# Patient Record
Sex: MALE | Race: OTHER | HISPANIC OR LATINO
[De-identification: names, ages, dates, MRNs, and addresses within clinical notes are randomized per-mention and may not be internally consistent; named-entity substitution may affect disease eponyms.]

---

## 2017-02-16 ENCOUNTER — APPOINTMENT (OUTPATIENT)
Dept: HEART AND VASCULAR | Facility: CLINIC | Age: 52
End: 2017-02-16

## 2017-03-07 ENCOUNTER — OUTPATIENT (OUTPATIENT)
Dept: OUTPATIENT SERVICES | Facility: HOSPITAL | Age: 52
LOS: 1 days | End: 2017-03-07

## 2017-03-07 ENCOUNTER — APPOINTMENT (OUTPATIENT)
Dept: RADIOLOGY | Facility: CLINIC | Age: 52
End: 2017-03-07

## 2017-03-07 ENCOUNTER — NON-APPOINTMENT (OUTPATIENT)
Age: 52
End: 2017-03-07

## 2017-03-07 ENCOUNTER — APPOINTMENT (OUTPATIENT)
Dept: INTERNAL MEDICINE | Facility: CLINIC | Age: 52
End: 2017-03-07

## 2017-03-07 VITALS
OXYGEN SATURATION: 98 % | HEIGHT: 68 IN | DIASTOLIC BLOOD PRESSURE: 88 MMHG | TEMPERATURE: 97.6 F | BODY MASS INDEX: 28.64 KG/M2 | WEIGHT: 189 LBS | SYSTOLIC BLOOD PRESSURE: 128 MMHG | HEART RATE: 85 BPM

## 2017-03-07 DIAGNOSIS — Z87.891 PERSONAL HISTORY OF NICOTINE DEPENDENCE: ICD-10-CM

## 2017-03-07 DIAGNOSIS — Z82.0 FAMILY HISTORY OF EPILEPSY AND OTHER DISEASES OF THE NERVOUS SYSTEM: ICD-10-CM

## 2017-03-07 DIAGNOSIS — Z82.49 FAMILY HISTORY OF ISCHEMIC HEART DISEASE AND OTHER DISEASES OF THE CIRCULATORY SYSTEM: ICD-10-CM

## 2017-03-07 DIAGNOSIS — Z82.3 FAMILY HISTORY OF STROKE: ICD-10-CM

## 2017-03-07 DIAGNOSIS — Z86.19 PERSONAL HISTORY OF OTHER INFECTIOUS AND PARASITIC DISEASES: ICD-10-CM

## 2017-03-10 ENCOUNTER — APPOINTMENT (OUTPATIENT)
Dept: HEART AND VASCULAR | Facility: CLINIC | Age: 52
End: 2017-03-10

## 2017-03-10 VITALS
SYSTOLIC BLOOD PRESSURE: 140 MMHG | WEIGHT: 190 LBS | OXYGEN SATURATION: 99 % | HEART RATE: 89 BPM | TEMPERATURE: 97.6 F | HEIGHT: 68 IN | DIASTOLIC BLOOD PRESSURE: 88 MMHG | BODY MASS INDEX: 28.79 KG/M2

## 2017-03-15 LAB
ALBUMIN SERPL ELPH-MCNC: 4.6 G/DL
ALP BLD-CCNC: 84 U/L
ALT SERPL-CCNC: 24 U/L
ANION GAP SERPL CALC-SCNC: 20 MMOL/L
AST SERPL-CCNC: 16 U/L
BASOPHILS # BLD AUTO: 0.03 K/UL
BASOPHILS NFR BLD AUTO: 0.6 %
BILIRUB SERPL-MCNC: 0.5 MG/DL
BUN SERPL-MCNC: 13 MG/DL
CALCIUM SERPL-MCNC: 10 MG/DL
CHLORIDE SERPL-SCNC: 101 MMOL/L
CHOLEST SERPL-MCNC: 247 MG/DL
CHOLEST/HDLC SERPL: 6 RATIO
CO2 SERPL-SCNC: 19 MMOL/L
CREAT SERPL-MCNC: 0.91 MG/DL
EOSINOPHIL # BLD AUTO: 0.34 K/UL
EOSINOPHIL NFR BLD AUTO: 6.5 %
GLUCOSE SERPL-MCNC: 100 MG/DL
HBA1C MFR BLD HPLC: 5.4 %
HCT VFR BLD CALC: 45.6 %
HDLC SERPL-MCNC: 41 MG/DL
HGB BLD-MCNC: 15.2 G/DL
IMM GRANULOCYTES NFR BLD AUTO: 0.2 %
LDLC SERPL CALC-MCNC: NORMAL MG/DL
LYMPHOCYTES # BLD AUTO: 1.83 K/UL
LYMPHOCYTES NFR BLD AUTO: 35.1 %
MAN DIFF?: NORMAL
MCHC RBC-ENTMCNC: 31.3 PG
MCHC RBC-ENTMCNC: 33.3 GM/DL
MCV RBC AUTO: 93.8 FL
MONOCYTES # BLD AUTO: 0.33 K/UL
MONOCYTES NFR BLD AUTO: 6.3 %
NEUTROPHILS # BLD AUTO: 2.67 K/UL
NEUTROPHILS NFR BLD AUTO: 51.3 %
PLATELET # BLD AUTO: 318 K/UL
POTASSIUM SERPL-SCNC: 5 MMOL/L
PROT SERPL-MCNC: 7.4 G/DL
PSA SERPL-MCNC: 1.48 NG/ML
RBC # BLD: 4.86 M/UL
RBC # FLD: 14 %
SODIUM SERPL-SCNC: 140 MMOL/L
TRIGL SERPL-MCNC: 401 MG/DL
TSH SERPL-ACNC: 1.63 UIU/ML
WBC # FLD AUTO: 5.21 K/UL

## 2017-03-16 ENCOUNTER — APPOINTMENT (OUTPATIENT)
Dept: HEART AND VASCULAR | Facility: CLINIC | Age: 52
End: 2017-03-16

## 2017-04-04 ENCOUNTER — APPOINTMENT (OUTPATIENT)
Dept: INTERNAL MEDICINE | Facility: CLINIC | Age: 52
End: 2017-04-04

## 2017-04-04 VITALS
HEART RATE: 83 BPM | HEIGHT: 68 IN | WEIGHT: 190 LBS | SYSTOLIC BLOOD PRESSURE: 110 MMHG | OXYGEN SATURATION: 98 % | DIASTOLIC BLOOD PRESSURE: 72 MMHG | BODY MASS INDEX: 28.79 KG/M2

## 2017-04-10 LAB
ALBUMIN SERPL ELPH-MCNC: 4.6 G/DL
ALP BLD-CCNC: 85 U/L
ALT SERPL-CCNC: 30 U/L
ANION GAP SERPL CALC-SCNC: 16 MMOL/L
AST SERPL-CCNC: 26 U/L
BILIRUB SERPL-MCNC: 0.5 MG/DL
BUN SERPL-MCNC: 13 MG/DL
CALCIUM SERPL-MCNC: 9.6 MG/DL
CHLORIDE SERPL-SCNC: 103 MMOL/L
CO2 SERPL-SCNC: 20 MMOL/L
CREAT SERPL-MCNC: 0.85 MG/DL
GLUCOSE SERPL-MCNC: 103 MG/DL
POTASSIUM SERPL-SCNC: 4.6 MMOL/L
PROT SERPL-MCNC: 7.3 G/DL
SODIUM SERPL-SCNC: 139 MMOL/L

## 2017-06-16 ENCOUNTER — APPOINTMENT (OUTPATIENT)
Dept: HEART AND VASCULAR | Facility: CLINIC | Age: 52
End: 2017-06-16

## 2017-06-16 VITALS
HEIGHT: 68 IN | WEIGHT: 188 LBS | OXYGEN SATURATION: 98 % | BODY MASS INDEX: 28.49 KG/M2 | SYSTOLIC BLOOD PRESSURE: 110 MMHG | HEART RATE: 65 BPM | DIASTOLIC BLOOD PRESSURE: 78 MMHG

## 2017-06-19 LAB
ALBUMIN SERPL ELPH-MCNC: 4.5 G/DL
ALP BLD-CCNC: 82 U/L
ALT SERPL-CCNC: 30 U/L
ANION GAP SERPL CALC-SCNC: 21 MMOL/L
AST SERPL-CCNC: 19 U/L
BILIRUB SERPL-MCNC: 0.5 MG/DL
BUN SERPL-MCNC: 15 MG/DL
CALCIUM SERPL-MCNC: 9.3 MG/DL
CHLORIDE SERPL-SCNC: 104 MMOL/L
CHOLEST SERPL-MCNC: 167 MG/DL
CHOLEST/HDLC SERPL: 3.3 RATIO
CO2 SERPL-SCNC: 21 MMOL/L
CREAT SERPL-MCNC: 0.87 MG/DL
GLUCOSE SERPL-MCNC: 95 MG/DL
HDLC SERPL-MCNC: 50 MG/DL
LDLC SERPL CALC-MCNC: 82 MG/DL
POTASSIUM SERPL-SCNC: 4.9 MMOL/L
PROT SERPL-MCNC: 7 G/DL
SODIUM SERPL-SCNC: 146 MMOL/L
TRIGL SERPL-MCNC: 174 MG/DL

## 2017-07-05 ENCOUNTER — APPOINTMENT (OUTPATIENT)
Dept: INTERNAL MEDICINE | Facility: CLINIC | Age: 52
End: 2017-07-05

## 2017-07-28 ENCOUNTER — APPOINTMENT (OUTPATIENT)
Dept: HEART AND VASCULAR | Facility: CLINIC | Age: 52
End: 2017-07-28
Payer: COMMERCIAL

## 2017-07-28 VITALS
DIASTOLIC BLOOD PRESSURE: 70 MMHG | HEART RATE: 80 BPM | TEMPERATURE: 98.5 F | WEIGHT: 188 LBS | BODY MASS INDEX: 28.49 KG/M2 | OXYGEN SATURATION: 98 % | HEIGHT: 68 IN | SYSTOLIC BLOOD PRESSURE: 110 MMHG | RESPIRATION RATE: 18 BRPM

## 2017-07-28 PROCEDURE — 99214 OFFICE O/P EST MOD 30 MIN: CPT

## 2017-07-28 PROCEDURE — 93880 EXTRACRANIAL BILAT STUDY: CPT

## 2017-08-14 ENCOUNTER — APPOINTMENT (OUTPATIENT)
Dept: GASTROENTEROLOGY | Facility: CLINIC | Age: 52
End: 2017-08-14
Payer: COMMERCIAL

## 2017-08-14 VITALS
SYSTOLIC BLOOD PRESSURE: 118 MMHG | RESPIRATION RATE: 14 BRPM | TEMPERATURE: 97.6 F | OXYGEN SATURATION: 98 % | DIASTOLIC BLOOD PRESSURE: 81 MMHG | BODY MASS INDEX: 28.79 KG/M2 | HEIGHT: 68 IN | HEART RATE: 74 BPM | WEIGHT: 190 LBS

## 2017-08-14 PROCEDURE — 36415 COLL VENOUS BLD VENIPUNCTURE: CPT | Mod: GC

## 2017-08-14 PROCEDURE — 99243 OFF/OP CNSLTJ NEW/EST LOW 30: CPT | Mod: 25

## 2017-09-05 LAB
ANION GAP SERPL CALC-SCNC: 13 MMOL/L
BASOPHILS # BLD AUTO: 0.02 K/UL
BASOPHILS NFR BLD AUTO: 0.4 %
BUN SERPL-MCNC: 17 MG/DL
CALCIUM SERPL-MCNC: 8.9 MG/DL
CHLORIDE SERPL-SCNC: 104 MMOL/L
CO2 SERPL-SCNC: 23 MMOL/L
CREAT SERPL-MCNC: 0.98 MG/DL
EOSINOPHIL # BLD AUTO: 0.45 K/UL
EOSINOPHIL NFR BLD AUTO: 8.6 %
GLUCOSE SERPL-MCNC: 95 MG/DL
HCT VFR BLD CALC: 43.4 %
HGB BLD-MCNC: 13.9 G/DL
IMM GRANULOCYTES NFR BLD AUTO: 0.2 %
LYMPHOCYTES # BLD AUTO: 1.59 K/UL
LYMPHOCYTES NFR BLD AUTO: 30.3 %
MAN DIFF?: NORMAL
MCHC RBC-ENTMCNC: 30 PG
MCHC RBC-ENTMCNC: 32 GM/DL
MCV RBC AUTO: 93.7 FL
MONOCYTES # BLD AUTO: 0.41 K/UL
MONOCYTES NFR BLD AUTO: 7.8 %
NEUTROPHILS # BLD AUTO: 2.76 K/UL
NEUTROPHILS NFR BLD AUTO: 52.7 %
PLATELET # BLD AUTO: 265 K/UL
POTASSIUM SERPL-SCNC: 4.4 MMOL/L
RBC # BLD: 4.63 M/UL
RBC # FLD: 13.7 %
SODIUM SERPL-SCNC: 140 MMOL/L
WBC # FLD AUTO: 5.24 K/UL

## 2017-09-11 ENCOUNTER — OUTPATIENT (OUTPATIENT)
Dept: OUTPATIENT SERVICES | Facility: HOSPITAL | Age: 52
LOS: 1 days | Discharge: HOME | End: 2017-09-11
Payer: COMMERCIAL

## 2017-09-11 ENCOUNTER — RESULT REVIEW (OUTPATIENT)
Age: 52
End: 2017-09-11

## 2017-09-11 ENCOUNTER — APPOINTMENT (OUTPATIENT)
Dept: GASTROENTEROLOGY | Facility: HOSPITAL | Age: 52
End: 2017-09-11

## 2017-09-11 PROCEDURE — 45380 COLONOSCOPY AND BIOPSY: CPT

## 2017-09-11 PROCEDURE — 88305 TISSUE EXAM BY PATHOLOGIST: CPT

## 2017-09-11 PROCEDURE — 45380 COLONOSCOPY AND BIOPSY: CPT | Mod: GC

## 2017-09-12 ENCOUNTER — TRANSCRIPTION ENCOUNTER (OUTPATIENT)
Age: 52
End: 2017-09-12

## 2017-09-12 LAB — SURGICAL PATHOLOGY STUDY: SIGNIFICANT CHANGE UP

## 2017-09-13 DIAGNOSIS — K63.5 POLYP OF COLON: ICD-10-CM

## 2017-09-13 DIAGNOSIS — K64.8 OTHER HEMORRHOIDS: ICD-10-CM

## 2017-09-18 ENCOUNTER — OTHER (OUTPATIENT)
Age: 52
End: 2017-09-18

## 2018-01-02 ENCOUNTER — RX RENEWAL (OUTPATIENT)
Age: 53
End: 2018-01-02

## 2018-01-02 ENCOUNTER — OTHER (OUTPATIENT)
Age: 53
End: 2018-01-02

## 2018-03-01 ENCOUNTER — APPOINTMENT (OUTPATIENT)
Dept: HEART AND VASCULAR | Facility: CLINIC | Age: 53
End: 2018-03-01

## 2018-03-03 ENCOUNTER — RX RENEWAL (OUTPATIENT)
Age: 53
End: 2018-03-03

## 2018-03-08 ENCOUNTER — OTHER (OUTPATIENT)
Age: 53
End: 2018-03-08

## 2018-03-12 ENCOUNTER — MEDICATION RENEWAL (OUTPATIENT)
Age: 53
End: 2018-03-12

## 2018-03-30 ENCOUNTER — APPOINTMENT (OUTPATIENT)
Dept: HEART AND VASCULAR | Facility: CLINIC | Age: 53
End: 2018-03-30
Payer: COMMERCIAL

## 2018-03-30 ENCOUNTER — NON-APPOINTMENT (OUTPATIENT)
Age: 53
End: 2018-03-30

## 2018-03-30 VITALS
DIASTOLIC BLOOD PRESSURE: 78 MMHG | SYSTOLIC BLOOD PRESSURE: 120 MMHG | OXYGEN SATURATION: 98 % | HEART RATE: 67 BPM | WEIGHT: 189 LBS | HEIGHT: 68 IN | BODY MASS INDEX: 28.64 KG/M2

## 2018-03-30 PROCEDURE — 99214 OFFICE O/P EST MOD 30 MIN: CPT

## 2018-05-16 ENCOUNTER — APPOINTMENT (OUTPATIENT)
Dept: INTERNAL MEDICINE | Facility: CLINIC | Age: 53
End: 2018-05-16
Payer: COMMERCIAL

## 2018-05-16 VITALS
TEMPERATURE: 99 F | HEART RATE: 70 BPM | SYSTOLIC BLOOD PRESSURE: 125 MMHG | OXYGEN SATURATION: 98 % | HEIGHT: 67 IN | BODY MASS INDEX: 29.19 KG/M2 | DIASTOLIC BLOOD PRESSURE: 76 MMHG | WEIGHT: 186 LBS

## 2018-05-16 DIAGNOSIS — L30.9 DERMATITIS, UNSPECIFIED: ICD-10-CM

## 2018-05-16 PROCEDURE — 99396 PREV VISIT EST AGE 40-64: CPT

## 2018-05-17 NOTE — HEALTH RISK ASSESSMENT
[Very Good] : ~his/her~  mood as very good [0] : 2) Feeling down, depressed, or hopeless: Not at all (0) [None] : None [Employed] : employed [Fully functional (bathing, dressing, toileting, transferring, walking, feeding)] : Fully functional (bathing, dressing, toileting, transferring, walking, feeding) [Fully functional (using the telephone, shopping, preparing meals, housekeeping, doing laundry, using] : Fully functional and needs no help or supervision to perform IADLs (using the telephone, shopping, preparing meals, housekeeping, doing laundry, using transportation, managing medications and managing finances) [] : No [[id number]] : 0

## 2018-05-17 NOTE — HISTORY OF PRESENT ILLNESS
[FreeTextEntry1] : Annual [de-identified] : No nausea, vomiting, diarrhea, and constipation. No chest pain or shortness of breath.\par Exercising a little more, trying to eat healthily.

## 2018-05-17 NOTE — HISTORY OF PRESENT ILLNESS
[FreeTextEntry1] : Annual [de-identified] : No nausea, vomiting, diarrhea, and constipation. No chest pain or shortness of breath.\par Exercising a little more, trying to eat healthily.

## 2018-05-17 NOTE — HEALTH RISK ASSESSMENT
[Very Good] : ~his/her~  mood as very good [0] : 2) Feeling down, depressed, or hopeless: Not at all (0) [None] : None [Employed] : employed [Fully functional (bathing, dressing, toileting, transferring, walking, feeding)] : Fully functional (bathing, dressing, toileting, transferring, walking, feeding) [Fully functional (using the telephone, shopping, preparing meals, housekeeping, doing laundry, using] : Fully functional and needs no help or supervision to perform IADLs (using the telephone, shopping, preparing meals, housekeeping, doing laundry, using transportation, managing medications and managing finances) [] : No [[id number]] : 0

## 2018-05-30 ENCOUNTER — APPOINTMENT (OUTPATIENT)
Dept: INTERNAL MEDICINE | Facility: CLINIC | Age: 53
End: 2018-05-30
Payer: COMMERCIAL

## 2018-05-30 PROCEDURE — 36415 COLL VENOUS BLD VENIPUNCTURE: CPT

## 2018-05-31 ENCOUNTER — APPOINTMENT (OUTPATIENT)
Dept: DERMATOLOGY | Facility: CLINIC | Age: 53
End: 2018-05-31
Payer: COMMERCIAL

## 2018-05-31 VITALS — DIASTOLIC BLOOD PRESSURE: 70 MMHG | SYSTOLIC BLOOD PRESSURE: 112 MMHG

## 2018-05-31 DIAGNOSIS — L73.9 FOLLICULAR DISORDER, UNSPECIFIED: ICD-10-CM

## 2018-05-31 DIAGNOSIS — L91.8 OTHER HYPERTROPHIC DISORDERS OF THE SKIN: ICD-10-CM

## 2018-05-31 DIAGNOSIS — Q82.5 CONGENITAL NON-NEOPLASTIC NEVUS: ICD-10-CM

## 2018-05-31 PROCEDURE — D0051: CPT

## 2018-05-31 PROCEDURE — 99203 OFFICE O/P NEW LOW 30 MIN: CPT

## 2018-06-06 LAB
ALBUMIN SERPL ELPH-MCNC: 4.4 G/DL
ALP BLD-CCNC: 61 U/L
ALT SERPL-CCNC: 25 U/L
ANION GAP SERPL CALC-SCNC: 14 MMOL/L
AST SERPL-CCNC: 27 U/L
BASOPHILS # BLD AUTO: 0.01 K/UL
BASOPHILS NFR BLD AUTO: 0.2 %
BILIRUB SERPL-MCNC: 0.7 MG/DL
BUN SERPL-MCNC: 15 MG/DL
CALCIUM SERPL-MCNC: 9.1 MG/DL
CHLORIDE SERPL-SCNC: 104 MMOL/L
CHOLEST SERPL-MCNC: 139 MG/DL
CHOLEST/HDLC SERPL: 3.3 RATIO
CK SERPL-CCNC: 158 U/L
CO2 SERPL-SCNC: 23 MMOL/L
CREAT SERPL-MCNC: 0.83 MG/DL
EOSINOPHIL # BLD AUTO: 0.42 K/UL
EOSINOPHIL NFR BLD AUTO: 7.9 %
GLUCOSE SERPL-MCNC: 73 MG/DL
HCT VFR BLD CALC: 43.8 %
HDLC SERPL-MCNC: 42 MG/DL
HGB BLD-MCNC: 14.3 G/DL
IMM GRANULOCYTES NFR BLD AUTO: 0.2 %
LDLC SERPL CALC-MCNC: 65 MG/DL
LYMPHOCYTES # BLD AUTO: 1.72 K/UL
LYMPHOCYTES NFR BLD AUTO: 32.5 %
MAN DIFF?: NORMAL
MCHC RBC-ENTMCNC: 30.9 PG
MCHC RBC-ENTMCNC: 32.6 GM/DL
MCV RBC AUTO: 94.6 FL
MONOCYTES # BLD AUTO: 0.22 K/UL
MONOCYTES NFR BLD AUTO: 4.2 %
NEUTROPHILS # BLD AUTO: 2.91 K/UL
NEUTROPHILS NFR BLD AUTO: 55 %
PLATELET # BLD AUTO: 270 K/UL
POTASSIUM SERPL-SCNC: 4.1 MMOL/L
PROT SERPL-MCNC: 7.1 G/DL
PSA SERPL-MCNC: 1.55 NG/ML
RBC # BLD: 4.63 M/UL
RBC # FLD: 14.6 %
SODIUM SERPL-SCNC: 141 MMOL/L
TESTOST SERPL-MCNC: 366.3 NG/DL
TRIGL SERPL-MCNC: 158 MG/DL
TSH SERPL-ACNC: 2.22 UIU/ML
WBC # FLD AUTO: 5.29 K/UL

## 2018-06-14 ENCOUNTER — APPOINTMENT (OUTPATIENT)
Dept: DERMATOLOGY | Facility: CLINIC | Age: 53
End: 2018-06-14
Payer: COMMERCIAL

## 2018-06-14 DIAGNOSIS — D18.01 HEMANGIOMA OF SKIN AND SUBCUTANEOUS TISSUE: ICD-10-CM

## 2018-06-14 PROCEDURE — D0051: CPT

## 2018-06-28 ENCOUNTER — APPOINTMENT (OUTPATIENT)
Dept: DERMATOLOGY | Facility: CLINIC | Age: 53
End: 2018-06-28

## 2018-08-10 ENCOUNTER — OTHER (OUTPATIENT)
Age: 53
End: 2018-08-10

## 2018-08-24 ENCOUNTER — OTHER (OUTPATIENT)
Age: 53
End: 2018-08-24

## 2018-09-14 ENCOUNTER — RX RENEWAL (OUTPATIENT)
Age: 53
End: 2018-09-14

## 2018-12-04 ENCOUNTER — APPOINTMENT (OUTPATIENT)
Dept: HEART AND VASCULAR | Facility: CLINIC | Age: 53
End: 2018-12-04

## 2018-12-31 ENCOUNTER — MEDICATION RENEWAL (OUTPATIENT)
Age: 53
End: 2018-12-31

## 2019-01-02 ENCOUNTER — APPOINTMENT (OUTPATIENT)
Dept: HEART AND VASCULAR | Facility: CLINIC | Age: 54
End: 2019-01-02
Payer: COMMERCIAL

## 2019-01-02 VITALS
OXYGEN SATURATION: 98 % | WEIGHT: 183 LBS | HEIGHT: 67 IN | BODY MASS INDEX: 28.72 KG/M2 | DIASTOLIC BLOOD PRESSURE: 77 MMHG | HEART RATE: 65 BPM | SYSTOLIC BLOOD PRESSURE: 121 MMHG

## 2019-01-02 PROCEDURE — 99214 OFFICE O/P EST MOD 30 MIN: CPT

## 2019-01-02 RX ORDER — ROSUVASTATIN CALCIUM 10 MG/1
10 TABLET, FILM COATED ORAL
Qty: 90 | Refills: 0 | Status: COMPLETED | COMMUNITY
Start: 2017-03-10 | End: 2019-01-02

## 2019-01-02 NOTE — REASON FOR VISIT
[Follow-Up - Clinic] : a clinic follow-up of [Hyperlipidemia] : hyperlipidemia [Hypertension] : hypertension [FreeTextEntry1] : Patient was lost  to follow up for a bit; he stopped crestor and losartan. No chest discomfort noted. However, he noted a shortness of breath lately. This is often noted with activity. Symptoms always improve at rest.Noted when climbing stairs. No recent labs. \par

## 2019-01-02 NOTE — PHYSICAL EXAM
[General Appearance - Well Developed] : well developed [Normal Appearance] : normal appearance [Well Groomed] : well groomed [General Appearance - Well Nourished] : well nourished [No Deformities] : no deformities [General Appearance - In No Acute Distress] : no acute distress [Normal Conjunctiva] : the conjunctiva exhibited no abnormalities [Eyelids - No Xanthelasma] : the eyelids demonstrated no xanthelasmas [Normal Oral Mucosa] : normal oral mucosa [No Oral Pallor] : no oral pallor [No Oral Cyanosis] : no oral cyanosis [Normal Jugular Venous A Waves Present] : normal jugular venous A waves present [Normal Jugular Venous V Waves Present] : normal jugular venous V waves present [No Jugular Venous Nix A Waves] : no jugular venous nix A waves [Respiration, Rhythm And Depth] : normal respiratory rhythm and effort [Exaggerated Use Of Accessory Muscles For Inspiration] : no accessory muscle use [Auscultation Breath Sounds / Voice Sounds] : lungs were clear to auscultation bilaterally [Heart Rate And Rhythm] : heart rate and rhythm were normal [Heart Sounds] : normal S1 and S2 [Murmurs] : no murmurs present [Abdomen Soft] : soft [Abdomen Tenderness] : non-tender [Abdomen Mass (___ Cm)] : no abdominal mass palpated [Abnormal Walk] : normal gait [Gait - Sufficient For Exercise Testing] : the gait was sufficient for exercise testing [Nail Clubbing] : no clubbing of the fingernails [Cyanosis, Localized] : no localized cyanosis [Petechial Hemorrhages (___cm)] : no petechial hemorrhages [Skin Color & Pigmentation] : normal skin color and pigmentation [] : no rash [No Venous Stasis] : no venous stasis [Skin Lesions] : no skin lesions [No Skin Ulcers] : no skin ulcer [No Xanthoma] : no  xanthoma was observed [Oriented To Time, Place, And Person] : oriented to person, place, and time [Affect] : the affect was normal [Mood] : the mood was normal [No Anxiety] : not feeling anxious

## 2019-01-11 ENCOUNTER — APPOINTMENT (OUTPATIENT)
Dept: HEART AND VASCULAR | Facility: CLINIC | Age: 54
End: 2019-01-11
Payer: COMMERCIAL

## 2019-01-11 VITALS
WEIGHT: 183 LBS | OXYGEN SATURATION: 98 % | SYSTOLIC BLOOD PRESSURE: 136 MMHG | BODY MASS INDEX: 28.72 KG/M2 | TEMPERATURE: 98 F | HEART RATE: 60 BPM | HEIGHT: 67 IN | DIASTOLIC BLOOD PRESSURE: 75 MMHG

## 2019-01-11 VITALS — DIASTOLIC BLOOD PRESSURE: 79 MMHG | SYSTOLIC BLOOD PRESSURE: 131 MMHG

## 2019-01-11 PROCEDURE — 93880 EXTRACRANIAL BILAT STUDY: CPT

## 2019-01-11 PROCEDURE — 36415 COLL VENOUS BLD VENIPUNCTURE: CPT

## 2019-01-11 PROCEDURE — 93306 TTE W/DOPPLER COMPLETE: CPT

## 2019-01-14 LAB
25(OH)D3 SERPL-MCNC: 17.8 NG/ML
ALBUMIN SERPL ELPH-MCNC: 4.6 G/DL
ALP BLD-CCNC: 72 U/L
ALT SERPL-CCNC: 23 U/L
ANION GAP SERPL CALC-SCNC: 10 MMOL/L
AST SERPL-CCNC: 19 U/L
BASOPHILS # BLD AUTO: 0.01 K/UL
BASOPHILS NFR BLD AUTO: 0.2 %
BILIRUB SERPL-MCNC: 0.6 MG/DL
BUN SERPL-MCNC: 15 MG/DL
CALCIUM SERPL-MCNC: 9.7 MG/DL
CHLORIDE SERPL-SCNC: 105 MMOL/L
CHOLEST SERPL-MCNC: 259 MG/DL
CHOLEST/HDLC SERPL: 6.5 RATIO
CO2 SERPL-SCNC: 26 MMOL/L
CREAT SERPL-MCNC: 0.91 MG/DL
EOSINOPHIL # BLD AUTO: 0.35 K/UL
EOSINOPHIL NFR BLD AUTO: 6.2 %
GLUCOSE SERPL-MCNC: 78 MG/DL
HBA1C MFR BLD HPLC: 5.2 %
HCT VFR BLD CALC: 43.3 %
HDLC SERPL-MCNC: 40 MG/DL
HGB BLD-MCNC: 14 G/DL
IMM GRANULOCYTES NFR BLD AUTO: 0.2 %
LDLC SERPL CALC-MCNC: 164 MG/DL
LYMPHOCYTES # BLD AUTO: 1.84 K/UL
LYMPHOCYTES NFR BLD AUTO: 32.6 %
MAGNESIUM SERPL-MCNC: 2.2 MG/DL
MAN DIFF?: NORMAL
MCHC RBC-ENTMCNC: 30.9 PG
MCHC RBC-ENTMCNC: 32.3 GM/DL
MCV RBC AUTO: 95.6 FL
MONOCYTES # BLD AUTO: 0.27 K/UL
MONOCYTES NFR BLD AUTO: 4.8 %
NEUTROPHILS # BLD AUTO: 3.17 K/UL
NEUTROPHILS NFR BLD AUTO: 56 %
PLATELET # BLD AUTO: 332 K/UL
POTASSIUM SERPL-SCNC: 4.5 MMOL/L
PROT SERPL-MCNC: 7.6 G/DL
RBC # BLD: 4.53 M/UL
RBC # FLD: 14 %
SODIUM SERPL-SCNC: 142 MMOL/L
T3FREE SERPL-MCNC: 3.19 PG/ML
T4 FREE SERPL-MCNC: 1.3 NG/DL
TRIGL SERPL-MCNC: 275 MG/DL
TSH SERPL-ACNC: 1.58 UIU/ML
VIT B12 SERPL-MCNC: 472 PG/ML
WBC # FLD AUTO: 5.65 K/UL

## 2019-02-01 ENCOUNTER — APPOINTMENT (OUTPATIENT)
Dept: HEART AND VASCULAR | Facility: CLINIC | Age: 54
End: 2019-02-01
Payer: COMMERCIAL

## 2019-02-01 VITALS
HEART RATE: 74 BPM | OXYGEN SATURATION: 97 % | DIASTOLIC BLOOD PRESSURE: 80 MMHG | SYSTOLIC BLOOD PRESSURE: 126 MMHG | BODY MASS INDEX: 29.51 KG/M2 | WEIGHT: 188 LBS | HEIGHT: 67 IN | TEMPERATURE: 98.1 F

## 2019-02-01 PROCEDURE — 99214 OFFICE O/P EST MOD 30 MIN: CPT

## 2019-03-13 ENCOUNTER — APPOINTMENT (OUTPATIENT)
Dept: PULMONOLOGY | Facility: CLINIC | Age: 54
End: 2019-03-13
Payer: COMMERCIAL

## 2019-03-13 VITALS
BODY MASS INDEX: 29.51 KG/M2 | HEIGHT: 67 IN | DIASTOLIC BLOOD PRESSURE: 71 MMHG | OXYGEN SATURATION: 98 % | TEMPERATURE: 98.5 F | HEART RATE: 65 BPM | WEIGHT: 188 LBS | SYSTOLIC BLOOD PRESSURE: 123 MMHG

## 2019-03-13 DIAGNOSIS — G47.33 OBSTRUCTIVE SLEEP APNEA (ADULT) (PEDIATRIC): ICD-10-CM

## 2019-03-13 PROCEDURE — 99244 OFF/OP CNSLTJ NEW/EST MOD 40: CPT

## 2019-03-13 NOTE — PHYSICAL EXAM
[General Appearance - Well Developed] : well developed [Normal Appearance] : normal appearance [Well Groomed] : well groomed [General Appearance - Well Nourished] : well nourished [No Deformities] : no deformities [General Appearance - In No Acute Distress] : no acute distress [Normal Conjunctiva] : the conjunctiva exhibited no abnormalities [Normal Oropharynx] : normal oropharynx [IV] : IV [Neck Appearance] : the appearance of the neck was normal [Apical Impulse] : the apical impulse was normal [Heart Rate And Rhythm] : heart rate was normal and rhythm regular [] : no respiratory distress [Respiration, Rhythm And Depth] : normal respiratory rhythm and effort [Exaggerated Use Of Accessory Muscles For Inspiration] : no accessory muscle use [Auscultation Breath Sounds / Voice Sounds] : lungs were clear to auscultation bilaterally [Abnormal Walk] : normal gait [Involuntary Movements] : no involuntary movements were seen [Nail Clubbing] : no clubbing of the fingernails [Cyanosis, Localized] : no localized cyanosis [Skin Color & Pigmentation] : normal skin color and pigmentation [No Focal Deficits] : no focal deficits [Oriented To Time, Place, And Person] : oriented to person, place, and time [Impaired Insight] : insight and judgment were intact

## 2019-03-13 NOTE — ASSESSMENT
[FreeTextEntry1] : This is a 53 year old male referred by Dr. Ramirez for evaluation of possible sleep apnea. The patient has multiple signs and symptoms of sleep-disordered breathing including snoring, apneas, unrefreshed sleep, and a grade IV palate. He was referred to the [facility] for a diagnostic PSG. The ramifications of VITALIY and its potential therapeutic modalities were discussed with the patient. The patient was cautioned on the importance of avoiding drowsy driving. He will follow up with us after the PSG.\par \par \par \par

## 2019-03-13 NOTE — CONSULT LETTER
[Dear  ___] : Dear  [unfilled], [Consult Letter:] : I had the pleasure of evaluating your patient, [unfilled]. [Please see my note below.] : Please see my note below. [Consult Closing:] : Thank you very much for allowing me to participate in the care of this patient.  If you have any questions, please do not hesitate to contact me. [Sincerely,] : Sincerely, [FreeTextEntry3] : Anali Glaser MD\par \par [place] [facility] [employer] [facility]\par Pulmonary, Critical Care, and Sleep Medicine\par

## 2019-03-13 NOTE — REVIEW OF SYSTEMS
[EDS: ESS=____] : daytime somnolence: ESS=[unfilled] [Negative] : Psychiatric [Difficulty Initiating Sleep] : no difficulty falling asleep [Lower Extremity Discomfort] : no lower extremity discomfort [Late day/ Evening symptoms] : no late day/evening symptoms [Unusual Sleep Behavior] : no unusual sleep behavior [Hypersomnolence] : not sleeping much more than usual [Cataplexy] :  no cataplexy

## 2019-03-13 NOTE — HISTORY OF PRESENT ILLNESS
[Obstructive Sleep Apnea] : obstructive sleep apnea [Snoring] : snoring [Witnessed Apneas] : witnessed sleep apnea [Daytime Somnolence] : daytime somnolence [ESS: ___] : ESS score [unfilled] [Awakes Unrefreshed] : awakening unrefreshed [Recent  Weight Gain] : recent weight gain [FreeTextEntry1] : 52 yo man with hld presenting with snoring and apneas as reported by his wife. He is endorsing unrefreshed sleep. He is also endorsing excessive daytime somnolence. He is endorsing only light sleep. He has gained weight recently.   [Frequent Nocturnal Awakening] : no nocturnal awakening [Unintentional Sleep while Active] : no unintentional sleep while active [Unintentional Sleep While Inactive] : no unintentional sleep while inactive [Awakes with Headache] : no headache upon awakening [Awakening With Dry Mouth] : no dry mouth upon awakening [DIS] : no DIS [DMS] : no DMS [Unusual Sleep Behavior] : no unusual sleep behavior [Hypersomnolence] : no hypersomnolence [Cataplexy] : no cataplexy [Sleep Paralysis] : no sleep paralysis [Hypnagogic Hallucinations] : no hallucinations when falling asleep [Hypnopompic Hallucinations] : no hallucinations when awakening [Lower Extremity Discomfort] : no lower extremity discomfort in evening or at bedtime

## 2019-03-15 ENCOUNTER — OTHER (OUTPATIENT)
Age: 54
End: 2019-03-15

## 2019-03-22 ENCOUNTER — OUTPATIENT (OUTPATIENT)
Dept: OUTPATIENT SERVICES | Facility: HOSPITAL | Age: 54
LOS: 1 days | End: 2019-03-22
Payer: COMMERCIAL

## 2019-03-22 ENCOUNTER — APPOINTMENT (OUTPATIENT)
Dept: SLEEP CENTER | Facility: HOSPITAL | Age: 54
End: 2019-03-22

## 2019-03-22 DIAGNOSIS — G47.33 OBSTRUCTIVE SLEEP APNEA (ADULT) (PEDIATRIC): ICD-10-CM

## 2019-03-22 PROCEDURE — 95810 POLYSOM 6/> YRS 4/> PARAM: CPT

## 2019-03-22 PROCEDURE — 95810 POLYSOM 6/> YRS 4/> PARAM: CPT | Mod: 26

## 2019-04-24 ENCOUNTER — RX RENEWAL (OUTPATIENT)
Age: 54
End: 2019-04-24

## 2019-05-02 ENCOUNTER — APPOINTMENT (OUTPATIENT)
Dept: HEART AND VASCULAR | Facility: CLINIC | Age: 54
End: 2019-05-02

## 2019-08-21 ENCOUNTER — APPOINTMENT (OUTPATIENT)
Dept: HEART AND VASCULAR | Facility: CLINIC | Age: 54
End: 2019-08-21

## 2019-08-26 ENCOUNTER — APPOINTMENT (OUTPATIENT)
Dept: HEART AND VASCULAR | Facility: CLINIC | Age: 54
End: 2019-08-26
Payer: COMMERCIAL

## 2019-08-26 VITALS
DIASTOLIC BLOOD PRESSURE: 74 MMHG | OXYGEN SATURATION: 98 % | SYSTOLIC BLOOD PRESSURE: 120 MMHG | TEMPERATURE: 98.1 F | HEART RATE: 72 BPM | BODY MASS INDEX: 30.45 KG/M2 | HEIGHT: 67 IN | WEIGHT: 194 LBS

## 2019-08-26 PROCEDURE — 99214 OFFICE O/P EST MOD 30 MIN: CPT

## 2019-08-26 NOTE — REASON FOR VISIT
[Follow-Up - Clinic] : a clinic follow-up of [Dyspnea] : dyspnea [FreeTextEntry1] : Presents for a follow up and evaluation. He admits to increased tinnitus since last we met. 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 He admits to occasional dyspnea. This is often noted with activity. Symptoms always improve at rest. B/p seems to be under good control without medications. Needs to see primary care as he missed his physical this year. \par

## 2019-08-26 NOTE — PHYSICAL EXAM
[General Appearance - Well Developed] : well developed [Normal Appearance] : normal appearance [Well Groomed] : well groomed [General Appearance - Well Nourished] : well nourished [No Deformities] : no deformities [General Appearance - In No Acute Distress] : no acute distress [Normal Conjunctiva] : the conjunctiva exhibited no abnormalities [Eyelids - No Xanthelasma] : the eyelids demonstrated no xanthelasmas [Normal Oral Mucosa] : normal oral mucosa [No Oral Pallor] : no oral pallor [No Oral Cyanosis] : no oral cyanosis [Normal Jugular Venous A Waves Present] : normal jugular venous A waves present [Normal Jugular Venous V Waves Present] : normal jugular venous V waves present [No Jugular Venous Nix A Waves] : no jugular venous nix A waves [Respiration, Rhythm And Depth] : normal respiratory rhythm and effort [Exaggerated Use Of Accessory Muscles For Inspiration] : no accessory muscle use [Auscultation Breath Sounds / Voice Sounds] : lungs were clear to auscultation bilaterally [Heart Rate And Rhythm] : heart rate and rhythm were normal [Heart Sounds] : normal S1 and S2 [Murmurs] : no murmurs present [Abdomen Soft] : soft [Abdomen Tenderness] : non-tender [Abdomen Mass (___ Cm)] : no abdominal mass palpated [Gait - Sufficient For Exercise Testing] : the gait was sufficient for exercise testing [Abnormal Walk] : normal gait [Nail Clubbing] : no clubbing of the fingernails [Cyanosis, Localized] : no localized cyanosis [Petechial Hemorrhages (___cm)] : no petechial hemorrhages [Skin Color & Pigmentation] : normal skin color and pigmentation [] : no rash [No Venous Stasis] : no venous stasis [Skin Lesions] : no skin lesions [No Skin Ulcers] : no skin ulcer [No Xanthoma] : no  xanthoma was observed [Affect] : the affect was normal [Oriented To Time, Place, And Person] : oriented to person, place, and time [No Anxiety] : not feeling anxious [Mood] : the mood was normal

## 2019-09-10 ENCOUNTER — APPOINTMENT (OUTPATIENT)
Dept: HEART AND VASCULAR | Facility: CLINIC | Age: 54
End: 2019-09-10
Payer: COMMERCIAL

## 2019-09-10 PROCEDURE — 93351 STRESS TTE COMPLETE: CPT

## 2019-09-16 ENCOUNTER — APPOINTMENT (OUTPATIENT)
Dept: INTERNAL MEDICINE | Facility: CLINIC | Age: 54
End: 2019-09-16

## 2019-10-01 ENCOUNTER — APPOINTMENT (OUTPATIENT)
Dept: INTERNAL MEDICINE | Facility: CLINIC | Age: 54
End: 2019-10-01

## 2019-10-30 ENCOUNTER — APPOINTMENT (OUTPATIENT)
Dept: INTERNAL MEDICINE | Facility: CLINIC | Age: 54
End: 2019-10-30
Payer: COMMERCIAL

## 2019-10-30 ENCOUNTER — LABORATORY RESULT (OUTPATIENT)
Age: 54
End: 2019-10-30

## 2019-10-30 VITALS
TEMPERATURE: 97.6 F | WEIGHT: 194 LBS | OXYGEN SATURATION: 97 % | HEIGHT: 67 IN | HEART RATE: 70 BPM | RESPIRATION RATE: 16 BRPM | BODY MASS INDEX: 30.45 KG/M2 | DIASTOLIC BLOOD PRESSURE: 87 MMHG | SYSTOLIC BLOOD PRESSURE: 146 MMHG

## 2019-10-30 DIAGNOSIS — R35.8 XXOTHER POLYURIA: ICD-10-CM

## 2019-10-30 PROCEDURE — 36415 COLL VENOUS BLD VENIPUNCTURE: CPT

## 2019-10-30 PROCEDURE — 99396 PREV VISIT EST AGE 40-64: CPT | Mod: 25

## 2019-10-30 RX ORDER — CLINDAMYCIN PHOSPHATE 10 MG/ML
1 SOLUTION TOPICAL
Qty: 1 | Refills: 3 | Status: DISCONTINUED | COMMUNITY
Start: 2018-05-31 | End: 2019-10-30

## 2019-10-30 RX ORDER — LIDOCAINE AND PRILOCAINE 25; 25 MG/G; MG/G
2.5-2.5 CREAM TOPICAL
Qty: 1 | Refills: 0 | Status: DISCONTINUED | COMMUNITY
Start: 2018-05-31 | End: 2019-10-30

## 2019-10-30 NOTE — HISTORY OF PRESENT ILLNESS
[de-identified] : Pt has a few days of fatigue and night sweats.  Was achy yesterday.  Happens once to a year with pt.  Usually viral.\par Weight stable.  Eating healthily.\par No nausea, vomiting, diarrhea, and constipation. No chest pain or shortness of breath.\par

## 2019-10-30 NOTE — REVIEW OF SYSTEMS
[Fever] : no fever [Recent Change In Weight] : ~T no recent weight change [Hesitancy] : no hesitancy [Nocturia] : nocturia [Negative] : Heme/Lymph

## 2019-10-30 NOTE — HEALTH RISK ASSESSMENT
[Very Good] : ~his/her~  mood as very good [] : No [0] : 2) Feeling down, depressed, or hopeless: Not at all (0) [[id number]] : 0 [Change in mental status noted] : No change in mental status noted [None] : None [With Significant Other] : lives with significant other [Employed] : employed [] :  [Reports changes in vision] : Reports no changes in vision [Reports changes in dental health] : Reports no changes in dental health

## 2019-11-14 LAB
ALBUMIN SERPL ELPH-MCNC: 4.9 G/DL
ALP BLD-CCNC: 85 U/L
ALT SERPL-CCNC: 28 U/L
ANION GAP SERPL CALC-SCNC: 16 MMOL/L
APPEARANCE: CLEAR
AST SERPL-CCNC: 23 U/L
BASOPHILS # BLD AUTO: 0.02 K/UL
BASOPHILS NFR BLD AUTO: 0.3 %
BILIRUB SERPL-MCNC: 0.3 MG/DL
BILIRUBIN URINE: NEGATIVE
BLOOD URINE: NEGATIVE
BUN SERPL-MCNC: 19 MG/DL
CALCIUM SERPL-MCNC: 9.8 MG/DL
CHLORIDE SERPL-SCNC: 104 MMOL/L
CHOLEST SERPL-MCNC: 211 MG/DL
CHOLEST/HDLC SERPL: 4.4 RATIO
CO2 SERPL-SCNC: 22 MMOL/L
COLOR: ABNORMAL
CREAT SERPL-MCNC: 0.91 MG/DL
EOSINOPHIL # BLD AUTO: 0.34 K/UL
EOSINOPHIL NFR BLD AUTO: 5.7 %
ERYTHROCYTE [SEDIMENTATION RATE] IN BLOOD BY WESTERGREN METHOD: 14 MM/HR
GLUCOSE QUALITATIVE U: NEGATIVE
GLUCOSE SERPL-MCNC: 94 MG/DL
HCT VFR BLD CALC: 45.4 %
HDLC SERPL-MCNC: 48 MG/DL
HGB BLD-MCNC: 14.8 G/DL
IMM GRANULOCYTES NFR BLD AUTO: 0.2 %
KETONES URINE: NEGATIVE
LDLC SERPL CALC-MCNC: 106 MG/DL
LEUKOCYTE ESTERASE URINE: NEGATIVE
LYMPHOCYTES # BLD AUTO: 1.55 K/UL
LYMPHOCYTES NFR BLD AUTO: 26.2 %
MAN DIFF?: NORMAL
MCHC RBC-ENTMCNC: 30.8 PG
MCHC RBC-ENTMCNC: 32.6 GM/DL
MCV RBC AUTO: 94.6 FL
MONOCYTES # BLD AUTO: 0.66 K/UL
MONOCYTES NFR BLD AUTO: 11.1 %
NEUTROPHILS # BLD AUTO: 3.34 K/UL
NEUTROPHILS NFR BLD AUTO: 56.5 %
NITRITE URINE: NEGATIVE
PH URINE: 5.5
PLATELET # BLD AUTO: 266 K/UL
POTASSIUM SERPL-SCNC: 4.8 MMOL/L
PROT SERPL-MCNC: 7.3 G/DL
PROTEIN URINE: NORMAL
PSA SERPL-MCNC: 5.23 NG/ML
RBC # BLD: 4.8 M/UL
RBC # FLD: 13.6 %
SODIUM SERPL-SCNC: 142 MMOL/L
SPECIFIC GRAVITY URINE: 1.03
TRIGL SERPL-MCNC: 286 MG/DL
TSH SERPL-ACNC: 3.27 UIU/ML
UROBILINOGEN URINE: NORMAL
WBC # FLD AUTO: 5.92 K/UL

## 2019-12-19 ENCOUNTER — APPOINTMENT (OUTPATIENT)
Dept: INTERNAL MEDICINE | Facility: CLINIC | Age: 54
End: 2019-12-19
Payer: SELF-PAY

## 2019-12-19 VITALS
DIASTOLIC BLOOD PRESSURE: 89 MMHG | SYSTOLIC BLOOD PRESSURE: 136 MMHG | HEART RATE: 64 BPM | BODY MASS INDEX: 30.92 KG/M2 | HEIGHT: 67 IN | WEIGHT: 197 LBS | OXYGEN SATURATION: 97 %

## 2019-12-19 PROCEDURE — 36415 COLL VENOUS BLD VENIPUNCTURE: CPT

## 2019-12-19 PROCEDURE — 99213 OFFICE O/P EST LOW 20 MIN: CPT | Mod: 25

## 2019-12-19 RX ORDER — CIPROFLOXACIN HYDROCHLORIDE 500 MG/1
500 TABLET, FILM COATED ORAL TWICE DAILY
Qty: 28 | Refills: 0 | Status: DISCONTINUED | COMMUNITY
Start: 2019-11-14 | End: 2019-12-19

## 2019-12-19 NOTE — HISTORY OF PRESENT ILLNESS
[FreeTextEntry1] : follow up [de-identified] : Patient's only for an annual physical at the end of October with an elevated PSA. Upon reviewing lab results over the phone he didn't feel he was having some urinary symptoms. Patient was treated with antibiotics and is here to follow PSA to see if improvement. Patient feels that his urination is normal and has no current complaints. No nausea, vomiting, diarrhea, and constipation. No chest pain or shortness of breath.\par

## 2019-12-19 NOTE — PHYSICAL EXAM
[No Acute Distress] : no acute distress [Well Nourished] : well nourished [Normal Sclera/Conjunctiva] : normal sclera/conjunctiva [EOMI] : extraocular movements intact [No JVD] : no jugular venous distention [No Lymphadenopathy] : no lymphadenopathy [No Respiratory Distress] : no respiratory distress  [Normal Rate] : normal rate  [Clear to Auscultation] : lungs were clear to auscultation bilaterally [Regular Rhythm] : with a regular rhythm [Coordination Grossly Intact] : coordination grossly intact [Normal Gait] : normal gait [Speech Grossly Normal] : speech grossly normal [Normal Mood] : the mood was normal

## 2019-12-19 NOTE — REVIEW OF SYSTEMS
[Fever] : no fever [Chest Pain] : no chest pain [Night Sweats] : no night sweats [Palpitations] : no palpitations [Cough] : no cough [Shortness Of Breath] : no shortness of breath [Dysuria] : no dysuria [Vomiting] : no vomiting [Nausea] : no nausea [Hematuria] : no hematuria

## 2019-12-24 LAB — PSA SERPL-MCNC: 2.66 NG/ML

## 2020-01-10 ENCOUNTER — APPOINTMENT (OUTPATIENT)
Dept: HEART AND VASCULAR | Facility: CLINIC | Age: 55
End: 2020-01-10

## 2020-04-30 ENCOUNTER — APPOINTMENT (OUTPATIENT)
Dept: INTERNAL MEDICINE | Facility: CLINIC | Age: 55
End: 2020-04-30
Payer: COMMERCIAL

## 2020-04-30 PROCEDURE — 99214 OFFICE O/P EST MOD 30 MIN: CPT | Mod: 95

## 2020-04-30 NOTE — PHYSICAL EXAM
[Well Developed] : well developed [Well Nourished] : well nourished [No Acute Distress] : no acute distress [Well-Appearing] : well-appearing [Normal Mood] : the mood was normal [Speech Grossly Normal] : speech grossly normal

## 2020-04-30 NOTE — REVIEW OF SYSTEMS
[Fatigue] : fatigue [Negative] : Heme/Lymph [Recent Change In Weight] : ~T no recent weight change [Hematuria] : no hematuria [Dysuria] : no dysuria

## 2020-04-30 NOTE — HISTORY OF PRESENT ILLNESS
[Home] : at home, [unfilled] , at the time of the visit. [Other Location: e.g. Home (Enter Location, City,State)___] : at [unfilled] [Patient] : the patient [FreeTextEntry2] : [place] [FreeTextEntry8] : Pt has recently last few weeks had a problem getting and sustaining an erection.  His Testosterone 2 years ago was normal.  Weight is steady.  Energy is somewhat down.  No nausea, vomiting, diarrhea, and constipation. No chest pain or shortness of breath.\par also needs refill of Lipitor.\par Pt self isolating in [facility] with wife during Pandemic

## 2021-01-18 ENCOUNTER — RX RENEWAL (OUTPATIENT)
Age: 56
End: 2021-01-18

## 2021-03-10 ENCOUNTER — APPOINTMENT (OUTPATIENT)
Dept: INTERNAL MEDICINE | Facility: CLINIC | Age: 56
End: 2021-03-10
Payer: COMMERCIAL

## 2021-03-10 ENCOUNTER — NON-APPOINTMENT (OUTPATIENT)
Age: 56
End: 2021-03-10

## 2021-03-10 VITALS
TEMPERATURE: 96.9 F | DIASTOLIC BLOOD PRESSURE: 92 MMHG | HEIGHT: 67 IN | SYSTOLIC BLOOD PRESSURE: 158 MMHG | HEART RATE: 89 BPM | OXYGEN SATURATION: 99 % | WEIGHT: 194 LBS | BODY MASS INDEX: 30.45 KG/M2 | RESPIRATION RATE: 16 BRPM

## 2021-03-10 DIAGNOSIS — R55 SYNCOPE AND COLLAPSE: ICD-10-CM

## 2021-03-10 DIAGNOSIS — R53.81 OTHER MALAISE: ICD-10-CM

## 2021-03-10 DIAGNOSIS — R53.83 OTHER MALAISE: ICD-10-CM

## 2021-03-10 DIAGNOSIS — Z87.438 PERSONAL HISTORY OF OTHER DISEASES OF MALE GENITAL ORGANS: ICD-10-CM

## 2021-03-10 DIAGNOSIS — M79.10 MYALGIA, UNSPECIFIED SITE: ICD-10-CM

## 2021-03-10 PROCEDURE — 99396 PREV VISIT EST AGE 40-64: CPT | Mod: 25

## 2021-03-10 PROCEDURE — 36415 COLL VENOUS BLD VENIPUNCTURE: CPT

## 2021-03-10 PROCEDURE — 93000 ELECTROCARDIOGRAM COMPLETE: CPT

## 2021-03-10 PROCEDURE — 99213 OFFICE O/P EST LOW 20 MIN: CPT | Mod: 25

## 2021-03-10 PROCEDURE — 99072 ADDL SUPL MATRL&STAF TM PHE: CPT

## 2021-03-10 NOTE — HISTORY OF PRESENT ILLNESS
[de-identified] : c/o myalgias and fatigued.  Increased sweating.  On off x 3 weeks.  No cough, congestion.  No N/V/D/C.\par Gained weight.

## 2021-03-10 NOTE — HEALTH RISK ASSESSMENT
[Very Good] : ~his/her~  mood as very good [0] : 2) Feeling down, depressed, or hopeless: Not at all (0) [] : No [[id number]] : 0

## 2021-03-18 LAB
ALBUMIN SERPL ELPH-MCNC: 4.8 G/DL
ALP BLD-CCNC: 90 U/L
ALT SERPL-CCNC: 30 U/L
ANION GAP SERPL CALC-SCNC: 14 MMOL/L
APPEARANCE: CLEAR
AST SERPL-CCNC: 21 U/L
BASOPHILS # BLD AUTO: 0.04 K/UL
BASOPHILS NFR BLD AUTO: 0.7 %
BILIRUB SERPL-MCNC: 0.3 MG/DL
BILIRUBIN URINE: NEGATIVE
BLOOD URINE: NEGATIVE
BUN SERPL-MCNC: 19 MG/DL
CALCIUM SERPL-MCNC: 9.6 MG/DL
CHLORIDE SERPL-SCNC: 104 MMOL/L
CHOLEST SERPL-MCNC: 186 MG/DL
CK SERPL-CCNC: 106 U/L
CO2 SERPL-SCNC: 23 MMOL/L
COLOR: YELLOW
CREAT SERPL-MCNC: 0.82 MG/DL
EOSINOPHIL # BLD AUTO: 0.4 K/UL
EOSINOPHIL NFR BLD AUTO: 6.6 %
ERYTHROCYTE [SEDIMENTATION RATE] IN BLOOD BY WESTERGREN METHOD: 2 MM/HR
FERRITIN SERPL-MCNC: 219 NG/ML
FOLATE SERPL-MCNC: 16.2 NG/ML
GLUCOSE QUALITATIVE U: NEGATIVE
GLUCOSE SERPL-MCNC: 101 MG/DL
HCT VFR BLD CALC: 45.9 %
HDLC SERPL-MCNC: 40 MG/DL
HGB BLD-MCNC: 15.4 G/DL
IMM GRANULOCYTES NFR BLD AUTO: 0.2 %
KETONES URINE: NEGATIVE
LDLC SERPL CALC-MCNC: NORMAL MG/DL
LEUKOCYTE ESTERASE URINE: NEGATIVE
LYMPHOCYTES # BLD AUTO: 1.76 K/UL
LYMPHOCYTES NFR BLD AUTO: 29 %
MAN DIFF?: NORMAL
MCHC RBC-ENTMCNC: 31.9 PG
MCHC RBC-ENTMCNC: 33.6 GM/DL
MCV RBC AUTO: 95 FL
MONOCYTES # BLD AUTO: 0.54 K/UL
MONOCYTES NFR BLD AUTO: 8.9 %
NEUTROPHILS # BLD AUTO: 3.31 K/UL
NEUTROPHILS NFR BLD AUTO: 54.6 %
NITRITE URINE: NEGATIVE
NONHDLC SERPL-MCNC: 146 MG/DL
PH URINE: 6
PLATELET # BLD AUTO: 306 K/UL
POTASSIUM SERPL-SCNC: 4.8 MMOL/L
PROT SERPL-MCNC: 7.2 G/DL
PROTEIN URINE: NEGATIVE
PSA SERPL-MCNC: 4.22 NG/ML
RBC # BLD: 4.83 M/UL
RBC # FLD: 12.6 %
SARS-COV-2 IGG SERPL IA-ACNC: 0.07 INDEX
SARS-COV-2 IGG SERPL QL IA: NEGATIVE
SODIUM SERPL-SCNC: 141 MMOL/L
SPECIFIC GRAVITY URINE: 1.02
THYROGLOB AB SERPL-ACNC: <20 IU/ML
THYROPEROXIDASE AB SERPL IA-ACNC: 18.4 IU/ML
TRIGL SERPL-MCNC: 458 MG/DL
TSH SERPL-ACNC: 1.51 UIU/ML
UROBILINOGEN URINE: NORMAL
VIT B12 SERPL-MCNC: 554 PG/ML
WBC # FLD AUTO: 6.06 K/UL

## 2021-03-23 ENCOUNTER — APPOINTMENT (OUTPATIENT)
Dept: HEART AND VASCULAR | Facility: CLINIC | Age: 56
End: 2021-03-23
Payer: COMMERCIAL

## 2021-03-23 VITALS
OXYGEN SATURATION: 98 % | BODY MASS INDEX: 30.45 KG/M2 | WEIGHT: 194 LBS | SYSTOLIC BLOOD PRESSURE: 132 MMHG | DIASTOLIC BLOOD PRESSURE: 76 MMHG | HEART RATE: 79 BPM | HEIGHT: 67 IN

## 2021-03-23 PROCEDURE — 99214 OFFICE O/P EST MOD 30 MIN: CPT

## 2021-03-23 PROCEDURE — 99072 ADDL SUPL MATRL&STAF TM PHE: CPT

## 2021-03-23 RX ORDER — SILDENAFIL 100 MG/1
100 TABLET, FILM COATED ORAL DAILY
Qty: 5 | Refills: 4 | Status: COMPLETED | COMMUNITY
Start: 2020-04-30 | End: 2021-03-23

## 2021-03-23 NOTE — DISCUSSION/SUMMARY
[FreeTextEntry1] : HTN - LONG  and I had an extensive discussion regarding his blood pressure management. Patient will continue taking current medications in addition to maintaining a low Na diet, with periodic b/p checks at home.\par HLD LONG and I discussed his lipid panel and individualized target LDL goal. At this point, will do diet and exercise with anticipation of re-evaluating labs in 3-6 months\par SARGENT LONG and I had a discussion of his symptoms. His risk for CAD falls intro intermediate. We will therefore move forward with a stress ekg and echocardiogram at this time to evaluate for obstructive CAD, provided an insurance approval can be obtained. Risks and benefits discussed.\par

## 2021-03-23 NOTE — REASON FOR VISIT
[Follow-Up - Clinic] : a clinic follow-up of [Hyperlipidemia] : hyperlipidemia [Hypertension] : hypertension [FreeTextEntry1] : Comes in for a follow up and re-evaluation. Remarks on dyspnea and fatigue. This is often noted with activity. Symptoms always improve at rest. Recent weight gain noted. Lipids are also abnormal. We are discussing a cardiac work up for the above complains as one has not been done recently.\par

## 2021-04-14 ENCOUNTER — APPOINTMENT (OUTPATIENT)
Dept: INTERNAL MEDICINE | Facility: CLINIC | Age: 56
End: 2021-04-14
Payer: COMMERCIAL

## 2021-04-14 DIAGNOSIS — H66.90 OTITIS MEDIA, UNSPECIFIED, UNSPECIFIED EAR: ICD-10-CM

## 2021-04-14 PROCEDURE — 99213 OFFICE O/P EST LOW 20 MIN: CPT | Mod: 95

## 2021-04-14 NOTE — REVIEW OF SYSTEMS
[Fever] : no fever [Night Sweats] : no night sweats [Earache] : earache [Nasal Discharge] : no nasal discharge [Chest Pain] : no chest pain [Palpitations] : no palpitations [Shortness Of Breath] : no shortness of breath [Cough] : no cough [Nausea] : no nausea [Vomiting] : no vomiting [Dysuria] : no dysuria [Hematuria] : no hematuria

## 2021-04-14 NOTE — HISTORY OF PRESENT ILLNESS
[Home] : at home, [unfilled] , at the time of the visit. [Medical Office: ([facility])___] : at the medical office located in  [Verbal consent obtained from patient] : the patient, [unfilled] [FreeTextEntry1] : Sinus [de-identified] : Had second COVID vaccine 4/1/21.  Was having itchy ears at the time.  Next day slept with mouth open andwoke up with iritated through.  No fever/cough.  Yesterday took a claritin and has helped.  Had a long plan flight in the last 10 days

## 2021-04-14 NOTE — PHYSICAL EXAM
[No Acute Distress] : no acute distress [Well Nourished] : well nourished [Normal Mood] : the mood was normal

## 2021-04-14 NOTE — PLAN
[FreeTextEntry1] : Rest, drink plenty, bland diet, take Tylenol for fever or aches and return to office/ER if worsening symptoms such as increased fever, vomiting, worsening headache, or other concerns.\par

## 2021-04-23 ENCOUNTER — APPOINTMENT (OUTPATIENT)
Dept: HEART AND VASCULAR | Facility: CLINIC | Age: 56
End: 2021-04-23

## 2021-05-12 ENCOUNTER — APPOINTMENT (OUTPATIENT)
Dept: INTERNAL MEDICINE | Facility: CLINIC | Age: 56
End: 2021-05-12

## 2021-05-17 ENCOUNTER — APPOINTMENT (OUTPATIENT)
Dept: HEART AND VASCULAR | Facility: CLINIC | Age: 56
End: 2021-05-17

## 2021-10-27 ENCOUNTER — APPOINTMENT (OUTPATIENT)
Dept: HEART AND VASCULAR | Facility: CLINIC | Age: 56
End: 2021-10-27
Payer: COMMERCIAL

## 2021-10-27 PROCEDURE — 36415 COLL VENOUS BLD VENIPUNCTURE: CPT

## 2021-10-28 LAB
25(OH)D3 SERPL-MCNC: 22.5 NG/ML
ALBUMIN SERPL ELPH-MCNC: 4.7 G/DL
ALP BLD-CCNC: 96 U/L
ALT SERPL-CCNC: 28 U/L
ANION GAP SERPL CALC-SCNC: 10 MMOL/L
AST SERPL-CCNC: 18 U/L
BASOPHILS # BLD AUTO: 0.03 K/UL
BASOPHILS NFR BLD AUTO: 0.6 %
BILIRUB SERPL-MCNC: 0.5 MG/DL
BUN SERPL-MCNC: 16 MG/DL
CALCIUM SERPL-MCNC: 9.7 MG/DL
CHLORIDE SERPL-SCNC: 105 MMOL/L
CHOLEST SERPL-MCNC: 228 MG/DL
CO2 SERPL-SCNC: 25 MMOL/L
CREAT SERPL-MCNC: 0.72 MG/DL
EOSINOPHIL # BLD AUTO: 0.44 K/UL
EOSINOPHIL NFR BLD AUTO: 9 %
ESTIMATED AVERAGE GLUCOSE: 108 MG/DL
GLUCOSE SERPL-MCNC: 94 MG/DL
HBA1C MFR BLD HPLC: 5.4 %
HCT VFR BLD CALC: 43.4 %
HDLC SERPL-MCNC: 43 MG/DL
HGB BLD-MCNC: 14.4 G/DL
IMM GRANULOCYTES NFR BLD AUTO: 0.2 %
LDLC SERPL CALC-MCNC: 107 MG/DL
LYMPHOCYTES # BLD AUTO: 1.46 K/UL
LYMPHOCYTES NFR BLD AUTO: 30 %
MAGNESIUM SERPL-MCNC: 2.3 MG/DL
MAN DIFF?: NORMAL
MCHC RBC-ENTMCNC: 30.9 PG
MCHC RBC-ENTMCNC: 33.2 GM/DL
MCV RBC AUTO: 93.1 FL
MONOCYTES # BLD AUTO: 0.44 K/UL
MONOCYTES NFR BLD AUTO: 9 %
NEUTROPHILS # BLD AUTO: 2.49 K/UL
NEUTROPHILS NFR BLD AUTO: 51.2 %
NONHDLC SERPL-MCNC: 185 MG/DL
PLATELET # BLD AUTO: 271 K/UL
POTASSIUM SERPL-SCNC: 4.5 MMOL/L
PROT SERPL-MCNC: 7 G/DL
RBC # BLD: 4.66 M/UL
RBC # FLD: 13.2 %
SODIUM SERPL-SCNC: 140 MMOL/L
T3FREE SERPL-MCNC: 3.52 PG/ML
T4 FREE SERPL-MCNC: 1.3 NG/DL
TRIGL SERPL-MCNC: 390 MG/DL
TSH SERPL-ACNC: 1.57 UIU/ML
VIT B12 SERPL-MCNC: 443 PG/ML
WBC # FLD AUTO: 4.87 K/UL

## 2021-11-03 ENCOUNTER — APPOINTMENT (OUTPATIENT)
Dept: HEART AND VASCULAR | Facility: CLINIC | Age: 56
End: 2021-11-03
Payer: COMMERCIAL

## 2021-11-03 VITALS
WEIGHT: 195 LBS | TEMPERATURE: 98.1 F | SYSTOLIC BLOOD PRESSURE: 127 MMHG | HEIGHT: 67 IN | HEART RATE: 92 BPM | DIASTOLIC BLOOD PRESSURE: 71 MMHG | BODY MASS INDEX: 30.61 KG/M2 | OXYGEN SATURATION: 98 %

## 2021-11-03 PROCEDURE — 99214 OFFICE O/P EST MOD 30 MIN: CPT

## 2021-11-03 RX ORDER — AZITHROMYCIN 250 MG/1
250 TABLET, FILM COATED ORAL
Qty: 6 | Refills: 0 | Status: COMPLETED | COMMUNITY
Start: 2021-04-14 | End: 2021-11-03

## 2021-11-04 NOTE — REASON FOR VISIT
[Symptom and Test Evaluation] : symptom and test evaluation [FreeTextEntry1] : Comes in for a follow up and re-evaluation. Remarks on dyspnea and fatigue. This is often noted with activity. Symptoms always improve at rest. Recent weight gain noted. Lipids are also abnormal. We are discussing a cardiac work up for the above complains as one has not been done recently.\par

## 2021-11-04 NOTE — DISCUSSION/SUMMARY
[FreeTextEntry1] : HTN - LONG  and I had an extensive discussion regarding his blood pressure management. Patient will continue taking current medications in addition to maintaining a low Na diet, with periodic b/p checks at home.\par HLD LONG and I discussed his lipid panel and individualized target LDL goal. At this point, will do diet and exercise with anticipation of re-evaluating labs in 3-6 months\par SOB/CP check Ca score, check echo provided his insurance company feels that it is a reasonable course of action and deems appropriate to approve.\par

## 2021-11-23 ENCOUNTER — TRANSCRIPTION ENCOUNTER (OUTPATIENT)
Age: 56
End: 2021-11-23

## 2021-11-24 ENCOUNTER — TRANSCRIPTION ENCOUNTER (OUTPATIENT)
Age: 56
End: 2021-11-24

## 2021-11-29 ENCOUNTER — TRANSCRIPTION ENCOUNTER (OUTPATIENT)
Age: 56
End: 2021-11-29

## 2021-11-30 ENCOUNTER — TRANSCRIPTION ENCOUNTER (OUTPATIENT)
Age: 56
End: 2021-11-30

## 2021-11-30 ENCOUNTER — APPOINTMENT (OUTPATIENT)
Dept: INTERNAL MEDICINE | Facility: CLINIC | Age: 56
End: 2021-11-30
Payer: COMMERCIAL

## 2021-11-30 ENCOUNTER — NON-APPOINTMENT (OUTPATIENT)
Age: 56
End: 2021-11-30

## 2021-11-30 PROCEDURE — 99213 OFFICE O/P EST LOW 20 MIN: CPT | Mod: 95

## 2021-11-30 NOTE — REVIEW OF SYSTEMS
[Fever] : no fever [Chest Pain] : no chest pain [Palpitations] : no palpitations [Shortness Of Breath] : no shortness of breath [Cough] : no cough [Nausea] : no nausea [Vomiting] : no vomiting

## 2021-11-30 NOTE — HISTORY OF PRESENT ILLNESS
[Home] : at home, [unfilled] , at the time of the visit. [Medical Office: ([facility])___] : at the medical office located in  [Verbal consent obtained from patient] : the patient, [unfilled] [de-identified] : Used viagra a few months back and was successful.  Mildly HA from past use.  Wants to try cialis.

## 2021-11-30 NOTE — PLAN
[FreeTextEntry1] : Cialis.  Possible side effects and possible adverse reactions discussed at length with the patients.  All patient's questions addressed pertaining to medication issues.\par

## 2022-01-05 ENCOUNTER — APPOINTMENT (OUTPATIENT)
Dept: HEART AND VASCULAR | Facility: CLINIC | Age: 57
End: 2022-01-05

## 2022-01-23 ENCOUNTER — RX RENEWAL (OUTPATIENT)
Age: 57
End: 2022-01-23

## 2022-06-21 ENCOUNTER — TRANSCRIPTION ENCOUNTER (OUTPATIENT)
Age: 57
End: 2022-06-21

## 2022-07-05 ENCOUNTER — TRANSCRIPTION ENCOUNTER (OUTPATIENT)
Age: 57
End: 2022-07-05

## 2022-07-07 ENCOUNTER — APPOINTMENT (OUTPATIENT)
Dept: INTERNAL MEDICINE | Facility: CLINIC | Age: 57
End: 2022-07-07

## 2022-07-07 ENCOUNTER — NON-APPOINTMENT (OUTPATIENT)
Age: 57
End: 2022-07-07

## 2022-07-07 VITALS
SYSTOLIC BLOOD PRESSURE: 121 MMHG | WEIGHT: 193 LBS | OXYGEN SATURATION: 98 % | TEMPERATURE: 98 F | HEART RATE: 64 BPM | BODY MASS INDEX: 30.29 KG/M2 | HEIGHT: 67 IN | DIASTOLIC BLOOD PRESSURE: 81 MMHG

## 2022-07-07 DIAGNOSIS — M25.562 PAIN IN RIGHT KNEE: ICD-10-CM

## 2022-07-07 DIAGNOSIS — Z87.09 PERSONAL HISTORY OF OTHER DISEASES OF THE RESPIRATORY SYSTEM: ICD-10-CM

## 2022-07-07 DIAGNOSIS — M25.561 PAIN IN RIGHT KNEE: ICD-10-CM

## 2022-07-07 DIAGNOSIS — Z00.00 ENCOUNTER FOR GENERAL ADULT MEDICAL EXAMINATION W/OUT ABNORMAL FINDINGS: ICD-10-CM

## 2022-07-07 PROCEDURE — 36415 COLL VENOUS BLD VENIPUNCTURE: CPT

## 2022-07-07 PROCEDURE — 93000 ELECTROCARDIOGRAM COMPLETE: CPT

## 2022-07-07 PROCEDURE — 99396 PREV VISIT EST AGE 40-64: CPT | Mod: 25

## 2022-07-07 PROCEDURE — 99213 OFFICE O/P EST LOW 20 MIN: CPT | Mod: 25

## 2022-07-07 NOTE — HEALTH RISK ASSESSMENT
[FreeTextEntry1] : 2 years old boy with diurnal incontinence for urine, currently improving with frequent reminders, changes in liquid intake and diet. On my examination today I do not see any abnormalities to suggest a tethered cord or other causes of myelopathy. However if his incontinence worsens, I will like to obtain sedated LS spine MRI. All questions were answered. [Very Good] : ~his/her~  mood as very good [0] : 2) Feeling down, depressed, or hopeless: Not at all (0) [PHQ-2 Negative - No further assessment needed] : PHQ-2 Negative - No further assessment needed [None] : None [With Significant Other] : lives with significant other [Employed] : employed [] :  [[id number]] : 0

## 2022-07-07 NOTE — HISTORY OF PRESENT ILLNESS
[de-identified] : No nausea, vomiting, diarrhea, and constipation. No chest pain or shortness of breath.\par Few episodes this week of urinary urgency.  Also with a few palpitations.  R knee pain and left ankle since using scooter.\par No change weight.

## 2022-07-07 NOTE — REVIEW OF SYSTEMS
[Joint Pain] : joint pain [Fever] : no fever [Night Sweats] : no night sweats [Discharge] : no discharge [Vision Problems] : no vision problems [Earache] : no earache [Nasal Discharge] : no nasal discharge [Chest Pain] : no chest pain [Claudication] : no  leg claudication [Shortness Of Breath] : no shortness of breath [Cough] : no cough [Nausea] : no nausea [Vomiting] : no vomiting [Itching] : no itching [Skin Rash] : no skin rash

## 2022-07-22 LAB
ALBUMIN SERPL ELPH-MCNC: 4.8 G/DL
ALP BLD-CCNC: 73 U/L
ALT SERPL-CCNC: 28 U/L
ANA SER IF-ACNC: NEGATIVE
ANION GAP SERPL CALC-SCNC: 10 MMOL/L
APPEARANCE: CLEAR
AST SERPL-CCNC: 19 U/L
BACTERIA UR CULT: NORMAL
BASOPHILS # BLD AUTO: 0.03 K/UL
BASOPHILS NFR BLD AUTO: 0.6 %
BILIRUB SERPL-MCNC: 0.4 MG/DL
BILIRUBIN URINE: NEGATIVE
BLOOD URINE: NEGATIVE
BUN SERPL-MCNC: 17 MG/DL
CALCIUM SERPL-MCNC: 9.8 MG/DL
CHLORIDE SERPL-SCNC: 105 MMOL/L
CHOLEST SERPL-MCNC: 169 MG/DL
CO2 SERPL-SCNC: 26 MMOL/L
COLOR: YELLOW
CREAT SERPL-MCNC: 0.85 MG/DL
CRP SERPL-MCNC: <3 MG/L
EGFR: 101 ML/MIN/1.73M2
EOSINOPHIL # BLD AUTO: 0.34 K/UL
EOSINOPHIL NFR BLD AUTO: 6.3 %
ERYTHROCYTE [SEDIMENTATION RATE] IN BLOOD BY WESTERGREN METHOD: < 2 MM/HR
FOLATE SERPL-MCNC: 10 NG/ML
GLUCOSE QUALITATIVE U: NEGATIVE
GLUCOSE SERPL-MCNC: 98 MG/DL
HCT VFR BLD CALC: 45.4 %
HDLC SERPL-MCNC: 46 MG/DL
HGB BLD-MCNC: 14.5 G/DL
IMM GRANULOCYTES NFR BLD AUTO: 0.2 %
KETONES URINE: NEGATIVE
LDLC SERPL CALC-MCNC: 72 MG/DL
LEUKOCYTE ESTERASE URINE: NEGATIVE
LYMPHOCYTES # BLD AUTO: 1.38 K/UL
LYMPHOCYTES NFR BLD AUTO: 25.7 %
MAN DIFF?: NORMAL
MCHC RBC-ENTMCNC: 31.3 PG
MCHC RBC-ENTMCNC: 31.9 GM/DL
MCV RBC AUTO: 97.8 FL
MONOCYTES # BLD AUTO: 0.44 K/UL
MONOCYTES NFR BLD AUTO: 8.2 %
NEUTROPHILS # BLD AUTO: 3.16 K/UL
NEUTROPHILS NFR BLD AUTO: 59 %
NITRITE URINE: NEGATIVE
NONHDLC SERPL-MCNC: 123 MG/DL
PH URINE: 6
PLATELET # BLD AUTO: 270 K/UL
POTASSIUM SERPL-SCNC: 5.2 MMOL/L
PROT SERPL-MCNC: 7.1 G/DL
PROTEIN URINE: NORMAL
PSA SERPL-MCNC: 4.7 NG/ML
RBC # BLD: 4.64 M/UL
RBC # FLD: 13.9 %
RHEUMATOID FACT SER QL: <10 IU/ML
SODIUM SERPL-SCNC: 141 MMOL/L
SPECIFIC GRAVITY URINE: 1.03
TRIGL SERPL-MCNC: 256 MG/DL
TSH SERPL-ACNC: 1.67 UIU/ML
UROBILINOGEN URINE: NORMAL
VIT B12 SERPL-MCNC: 400 PG/ML
WBC # FLD AUTO: 5.36 K/UL

## 2022-08-17 ENCOUNTER — NON-APPOINTMENT (OUTPATIENT)
Age: 57
End: 2022-08-17

## 2022-08-17 ENCOUNTER — APPOINTMENT (OUTPATIENT)
Dept: UROLOGY | Facility: CLINIC | Age: 57
End: 2022-08-17

## 2022-08-17 VITALS
TEMPERATURE: 98.2 F | SYSTOLIC BLOOD PRESSURE: 142 MMHG | RESPIRATION RATE: 19 BRPM | HEART RATE: 79 BPM | DIASTOLIC BLOOD PRESSURE: 86 MMHG

## 2022-08-17 DIAGNOSIS — R35.1 NOCTURIA: ICD-10-CM

## 2022-08-17 PROCEDURE — 99204 OFFICE O/P NEW MOD 45 MIN: CPT

## 2022-08-17 NOTE — ADDENDUM
[FreeTextEntry1] : A portion of this note was written by [Rajiv Stratton] acting as a scribe for Dr. Ady Son.

## 2022-08-17 NOTE — LETTER BODY
[Dear  ___] : Dear  [unfilled], [Consult Letter:] : I had the pleasure of evaluating your patient, [unfilled]. [Please see my note below.] : Please see my note below. [Consult Closing:] : Thank you very much for allowing me to participate in the care of this patient.  If you have any questions, please do not hesitate to contact me. [Sincerely,] : Sincerely, [FreeTextEntry2] : Jordan Dawkins MD [FreeTextEntry1] : Dear Juancho,\par \par Thank you for your kind referral.  I am enclosing a copy of my office note for your information.\par \par I will keep you informed of any developments.\par \par Feel free to contact me if you have any questions.\par \par Sincerely,\par \par Ady Son MD, FACS\par Professor of Urology\par [facility]\par \par [address]\par [place], [place] [address]\par \par [address]\[place], [place] [address]\par \par Office Telephone \par [phone]\par \par Fax\par [phone]\par \par \par  [FreeTextEntry3] : Dr. Ady Son MD

## 2022-08-17 NOTE — PHYSICAL EXAM
[Rectal Exam - Rectum] : digital rectal exam was normal [Prostate Tenderness] : the prostate was not tender [Bowel Sounds] : normal bowel sounds [Abdomen Soft] : soft [Abdomen Tenderness] : non-tender [] : no hepato-splenomegaly [Abdomen Mass (___ Cm)] : no abdominal mass palpated [Abdomen Hernia] : no hernia was discovered [Costovertebral Angle Tenderness] : no ~M costovertebral angle tenderness [Urethral Meatus] : meatus normal [Epididymis] : the epididymides were normal [Testes Tenderness] : no tenderness of the testes [Prostate Enlargement] : the prostate was not enlarged [No Prostate Nodules] : no prostate nodules

## 2022-08-17 NOTE — HISTORY OF PRESENT ILLNESS
[Currently Experiencing ___] :  [unfilled] [Nocturia] : nocturia [Erectile Dysfunction] : Erectile Dysfunction [FreeTextEntry1] : 56 year old , originally from [facility] presents for a urological evaluation (libido/erectile issue, LUTS, elevated PSA)\par , daughter (32 years old)\par \par Decreased libido x 3-4 years. GA 20. \par responds to PD5-I; but not interested in sexual intercourse\par \par Moderate chronic lower urinary tract symptoms, with nocturia x 2. \par Flow: Peak: 19.6ml/sec, Volume: 221.8cc (bell pattern)\par \par Elevated PSA (see full PSA history below). No family history of prostate cancer. \par \par PSA History\par 7/7/22 4.7\par 3/10/21 4.22\par 12/19/19 2.66\par 10/30/19 5.23\par 3/7/17 1.48 [Urinary Incontinence] : no urinary incontinence [Urinary Retention] : no urinary retention [Urinary Urgency] : no urinary urgency [Urinary Frequency] : no urinary frequency [Straining] : no straining [Weak Stream] : no weak stream

## 2022-08-17 NOTE — ASSESSMENT
[FreeTextEntry1] : 08.17.2022\par \par 56 year old   originally from [facility] presents for a urological evaluation\par , daughter (32 years old)\par \par Decreased libido x 3-4 years. GA 20. \par \par Longstanding moderate chronic lower urinary tract symptoms, with nocturia x 2. Had an evaluation for sleep apnea. NO VITALIY\par Flow: Peak: 19.6ml/sec, Volume: 221.8cc (bell pattern)\par Sono: 32cc PVR (done to rule out incomplete bladder emptying)\par Regarding his lower urinary tract symptoms, he discussed behavioral modification (decreasing afternoon fluid intake, decreased caffeine intake) in an effort to decrease the nocturia. His PVR and Flow are both satisfactory and do not suggest bladder outlet obstruction. He currently drinks 2 pots of coffee daily and two bottles of Ana Cristina. We agreed that he can decrease this to 1 pot or change to decaf. \par \par \par Elevated PSA (see full PSA history below). No family history of prostate cancer. \par \par PSA History\par 7/7/22 4.7\par 3/10/21 4.22\par 12/19/19 2.66\par 10/30/19 5.23\par 3/7/17 1.48\par \par I discussed possible causes of of an elevated PSA with Mr. Holland. \par We discussed need for confirmatory PSA.\par We discussed the risks and complications of prostate biopsy and discussed the procedure.\par We discussed that procedure is performed by placing a rectal probe and administering anesthesia locally.\par Biopsies are then taken with a needle. Multiple biopsies are taken, \par Risks of the procedure include hematuria, hematospermia, blood per rectum.\par Risks include infection, sepsis and even death.\par We discussed perineal vs transrectal biopsy\par We discussed decreased risk of perineal biopsy vs transrectal but associated greater discomfort \par We discussed options of: 1. local, 2. IV sedation in Operating room 3. po Valium\par Fleets enema should be taken.\par ASA and NSAID must be stopped 1 week prior to procedure.\par \par Regarding the decreased libido, we will proceed with a hormonal evaluation. He denies any martial discord relating to this issue, and seems uninterested in pursuing.\par \par \par \par Mr. Holland indicated his understanding of the plan and all questions were answered. \par \par Plan:\par 1. PSA, Testosterone\par 2. If PSA elevated, MRI Prostate (+/-)\par 3. Behavioral modification for LUTS\par 4. Follow up in three months (if PSA is normalized)\par

## 2022-08-18 ENCOUNTER — TRANSCRIPTION ENCOUNTER (OUTPATIENT)
Age: 57
End: 2022-08-18

## 2022-08-18 LAB
PSA FREE FLD-MCNC: 23 %
PSA FREE SERPL-MCNC: 0.61 NG/ML
PSA SERPL-MCNC: 2.72 NG/ML

## 2022-08-19 LAB — BACTERIA UR CULT: NORMAL

## 2022-08-23 ENCOUNTER — RESULT REVIEW (OUTPATIENT)
Age: 57
End: 2022-08-23

## 2022-08-23 ENCOUNTER — OUTPATIENT (OUTPATIENT)
Dept: OUTPATIENT SERVICES | Facility: HOSPITAL | Age: 57
LOS: 1 days | End: 2022-08-23

## 2022-08-23 ENCOUNTER — APPOINTMENT (OUTPATIENT)
Dept: MRI IMAGING | Facility: CLINIC | Age: 57
End: 2022-08-23

## 2022-08-23 PROCEDURE — 72197 MRI PELVIS W/O & W/DYE: CPT | Mod: 26

## 2022-08-26 ENCOUNTER — TRANSCRIPTION ENCOUNTER (OUTPATIENT)
Age: 57
End: 2022-08-26

## 2022-08-30 ENCOUNTER — APPOINTMENT (OUTPATIENT)
Dept: INTERNAL MEDICINE | Facility: CLINIC | Age: 57
End: 2022-08-30

## 2022-08-30 ENCOUNTER — APPOINTMENT (OUTPATIENT)
Dept: HEART AND VASCULAR | Facility: CLINIC | Age: 57
End: 2022-08-30

## 2022-08-30 ENCOUNTER — NON-APPOINTMENT (OUTPATIENT)
Age: 57
End: 2022-08-30

## 2022-08-30 VITALS
SYSTOLIC BLOOD PRESSURE: 118 MMHG | WEIGHT: 194 LBS | OXYGEN SATURATION: 98 % | BODY MASS INDEX: 30.45 KG/M2 | TEMPERATURE: 96.9 F | HEIGHT: 67 IN | HEART RATE: 90 BPM | DIASTOLIC BLOOD PRESSURE: 81 MMHG

## 2022-08-30 VITALS
BODY MASS INDEX: 30.69 KG/M2 | OXYGEN SATURATION: 98 % | TEMPERATURE: 97.7 F | WEIGHT: 195.56 LBS | HEIGHT: 67 IN | DIASTOLIC BLOOD PRESSURE: 76 MMHG | SYSTOLIC BLOOD PRESSURE: 146 MMHG | HEART RATE: 81 BPM

## 2022-08-30 VITALS — SYSTOLIC BLOOD PRESSURE: 123 MMHG | DIASTOLIC BLOOD PRESSURE: 84 MMHG

## 2022-08-30 DIAGNOSIS — Z11.59 ENCOUNTER FOR SCREENING FOR OTHER VIRAL DISEASES: ICD-10-CM

## 2022-08-30 DIAGNOSIS — I10 ESSENTIAL (PRIMARY) HYPERTENSION: ICD-10-CM

## 2022-08-30 DIAGNOSIS — E78.00 PURE HYPERCHOLESTEROLEMIA, UNSPECIFIED: ICD-10-CM

## 2022-08-30 DIAGNOSIS — Z01.818 ENCOUNTER FOR OTHER PREPROCEDURAL EXAMINATION: ICD-10-CM

## 2022-08-30 DIAGNOSIS — Z87.898 PERSONAL HISTORY OF OTHER SPECIFIED CONDITIONS: ICD-10-CM

## 2022-08-30 PROCEDURE — 36415 COLL VENOUS BLD VENIPUNCTURE: CPT

## 2022-08-30 PROCEDURE — 99214 OFFICE O/P EST MOD 30 MIN: CPT

## 2022-08-30 PROCEDURE — 99214 OFFICE O/P EST MOD 30 MIN: CPT | Mod: 25

## 2022-08-30 RX ORDER — TADALAFIL 20 MG/1
20 TABLET ORAL
Qty: 10 | Refills: 5 | Status: DISCONTINUED | COMMUNITY
Start: 2021-11-30 | End: 2022-08-30

## 2022-08-30 NOTE — DISCUSSION/SUMMARY
[Procedure Low Risk] : the procedure risk is low [Patient Low Risk] : the patient is a low surgical risk [Optimized for Surgery] : the patient is optimized for surgery [FreeTextEntry1] : Pre-op From cardiovascular standpoint, Mr. VERMA is of acceptable risk for the planned procedure and may move forward without further cardiovascular testing.\par HTN - LONG  and I had an extensive discussion regarding his blood pressure management. Patient will continue taking current medications in addition to maintaining a low Na diet, with periodic b/p checks at home. check echo\par HLD LONG and I discussed his lipid panel and individualized target LDL goal. At this point, will do diet and exercise with anticipation of re-evaluating labs in 3-6 months\par

## 2022-08-30 NOTE — HISTORY OF PRESENT ILLNESS
[Preoperative Visit] : for a medical evaluation prior to surgery [Scheduled Procedure ___] : a [unfilled] [Date of Surgery ___] : on [unfilled] [Surgeon Name ___] : surgeon: [unfilled] [Cardiovascular Disease] : cardiovascular disease [Prior Anesthesia] : Prior anesthesia [Electrocardiogram] : ~T an ECG ~C was performed [Metabolic Capacity ___Mets%] : The patient has a metabolic capacity of [unfilled] Mets%  [Good] : Good [Fever] : no fever [Chills] : no chills [Fatigue] : no fatigue [Chest Pain] : no chest pain [Cough] : no cough [Dyspnea] : no dyspnea [Dysuria] : no dysuria [Urinary Frequency] : no urinary frequency [Nausea] : no nausea [Vomiting] : no vomiting [Diarrhea] : no diarrhea [Abdominal Pain] : no abdominal pain [Easy Bruising] : no easy bruising [Lower Extremity Swelling] : no lower extremity swelling [Poor Exercise Tolerance] : no poor exercise tolerance [Diabetes] : no diabetes [Pulmonary Disease] : no pulmonary disease [Anti-Platelet Agents] : no anti-platelet agents [Nicotine Dependence] : no nicotine dependence [Alcohol Use] : no  alcohol use [Renal Disease] : no renal disease [GI Disease] : no gastrointestinal disease [Sleep Apnea] : no sleep apnea [Thromboembolic Problems] : no thromboembolic problems [Frequent use of NSAIDs] : no use of NSAIDs [Transfusion Reaction] : no transfusion reaction [Impaired Immunity] : no impaired immunity [Steroid Use in Last 6 Months] : no steroid use in the last six months [Frequent Aspirin Use] : no frequent aspirin use [Prev Anesthesia Reaction] : no previous anesthesia reaction [Anesthesia Reaction] : no anesthesia reaction [Sudden Death] : no sudden death [Clotting Disorder] : no clotting disorder [Bleeding Disorder] : no bleeding disorder [de-identified] : Dr. Son [FreeTextEntry1] : 55 yo man with a pmhx of HTN, HLD and BPH. He is here today for pre-op clearance for a prostate Bx. Denies any CV symptoms at this time.

## 2022-08-31 NOTE — HISTORY OF PRESENT ILLNESS
[No Pertinent Cardiac History] : no history of aortic stenosis, atrial fibrillation, coronary artery disease, recent myocardial infarction, or implantable device/pacemaker [Sleep Apnea] : sleep apnea [No Adverse Anesthesia Reaction] : no adverse anesthesia reaction in self or family member [(Patient denies any chest pain, claudication, dyspnea on exertion, orthopnea, palpitations or syncope)] : Patient denies any chest pain, claudication, dyspnea on exertion, orthopnea, palpitations or syncope [Moderate (4-6 METs)] : Moderate (4-6 METs) [Asthma] : no asthma [COPD] : no COPD [Smoker] : not a smoker [Chronic Anticoagulation] : no chronic anticoagulation [Chronic Kidney Disease] : no chronic kidney disease [Diabetes] : no diabetes [FreeTextEntry1] : Prostate Bx [FreeTextEntry2] : 9/21/22 [FreeTextEntry3] : Dr. Son [FreeTextEntry4] : Needs Bx prostate.  No nausea, vomiting, diarrhea, and constipation. No chest pain or shortness of breath.\par

## 2022-08-31 NOTE — REVIEW OF SYSTEMS
[Fever] : no fever [Night Sweats] : no night sweats [Discharge] : no discharge [Vision Problems] : no vision problems [Earache] : no earache [Nasal Discharge] : no nasal discharge [Chest Pain] : no chest pain [Palpitations] : no palpitations [Shortness Of Breath] : no shortness of breath [Cough] : no cough [Nausea] : no nausea [Vomiting] : no vomiting [Dysuria] : no dysuria [Hematuria] : no hematuria [Joint Pain] : no joint pain [Joint Stiffness] : no joint stiffness

## 2022-08-31 NOTE — ADDENDUM
[FreeTextEntry1] : Labs are within acceptable limits.  Electrocardiogram from July shows sinus rhythm with no acute ST-T wave changes.\par \par Patient is medically optimized and cleared for prostate biopsy and anesthesia.

## 2022-09-07 LAB
ALBUMIN SERPL ELPH-MCNC: 5.2 G/DL
ALP BLD-CCNC: 95 U/L
ALT SERPL-CCNC: 33 U/L
ANION GAP SERPL CALC-SCNC: 13 MMOL/L
APPEARANCE: CLEAR
APTT BLD: 34.5 SEC
AST SERPL-CCNC: 26 U/L
BACTERIA UR CULT: NORMAL
BASOPHILS # BLD AUTO: 0.03 K/UL
BASOPHILS NFR BLD AUTO: 0.5 %
BILIRUB SERPL-MCNC: 0.5 MG/DL
BILIRUBIN URINE: NEGATIVE
BLOOD URINE: NEGATIVE
BUN SERPL-MCNC: 16 MG/DL
CALCIUM SERPL-MCNC: 10 MG/DL
CHLORIDE SERPL-SCNC: 104 MMOL/L
CO2 SERPL-SCNC: 25 MMOL/L
COLOR: YELLOW
CREAT SERPL-MCNC: 0.89 MG/DL
EGFR: 99 ML/MIN/1.73M2
EOSINOPHIL # BLD AUTO: 0.32 K/UL
EOSINOPHIL NFR BLD AUTO: 5.3 %
GLUCOSE QUALITATIVE U: NEGATIVE
GLUCOSE SERPL-MCNC: 145 MG/DL
HCT VFR BLD CALC: 44.5 %
HGB BLD-MCNC: 15.2 G/DL
IMM GRANULOCYTES NFR BLD AUTO: 0.3 %
INR PPP: 0.99 RATIO
KETONES URINE: NEGATIVE
LEUKOCYTE ESTERASE URINE: NEGATIVE
LYMPHOCYTES # BLD AUTO: 1.61 K/UL
LYMPHOCYTES NFR BLD AUTO: 26.8 %
MAN DIFF?: NORMAL
MCHC RBC-ENTMCNC: 31.3 PG
MCHC RBC-ENTMCNC: 34.2 GM/DL
MCV RBC AUTO: 91.6 FL
MONOCYTES # BLD AUTO: 0.39 K/UL
MONOCYTES NFR BLD AUTO: 6.5 %
NEUTROPHILS # BLD AUTO: 3.64 K/UL
NEUTROPHILS NFR BLD AUTO: 60.6 %
NITRITE URINE: NEGATIVE
PH URINE: 6.5
PLATELET # BLD AUTO: 281 K/UL
POTASSIUM SERPL-SCNC: 4.4 MMOL/L
PROT SERPL-MCNC: 7.4 G/DL
PROTEIN URINE: NORMAL
PT BLD: 11.5 SEC
RBC # BLD: 4.86 M/UL
RBC # FLD: 13.1 %
SODIUM SERPL-SCNC: 143 MMOL/L
SPECIFIC GRAVITY URINE: 1.03
UROBILINOGEN URINE: NORMAL
WBC # FLD AUTO: 6.01 K/UL

## 2022-09-19 ENCOUNTER — NON-APPOINTMENT (OUTPATIENT)
Age: 57
End: 2022-09-19

## 2022-09-19 LAB — SARS-COV-2 N GENE NPH QL NAA+PROBE: NOT DETECTED

## 2022-09-20 ENCOUNTER — TRANSCRIPTION ENCOUNTER (OUTPATIENT)
Age: 57
End: 2022-09-20

## 2022-09-20 ENCOUNTER — RESULT REVIEW (OUTPATIENT)
Age: 57
End: 2022-09-20

## 2022-09-20 NOTE — ASU PATIENT PROFILE, ADULT - FALL HARM RISK - UNIVERSAL INTERVENTIONS
Bed in lowest position, wheels locked, appropriate side rails in place/Call bell, personal items and telephone in reach/Instruct patient to call for assistance before getting out of bed or chair/Non-slip footwear when patient is out of bed/Burns to call system/Physically safe environment - no spills, clutter or unnecessary equipment/Purposeful Proactive Rounding/Room/bathroom lighting operational, light cord in reach

## 2022-09-20 NOTE — ASU PATIENT PROFILE, ADULT - NS PREOP UNDERSTANDS INFO
Time given by MD , arrive 0800 am procedure at 100:00 am , NPO after 12MN, no smoking, drinking, , bring ID photo, insurance and vaccination  cards, Escort arranged, address and telephone given to patient/yes

## 2022-09-20 NOTE — ASU PATIENT PROFILE, ADULT - NSICDXPASTMEDICALHX_GEN_ALL_CORE_FT
PAST MEDICAL HISTORY:  History of BPH     Hypercholesteremia     VITALIY (obstructive sleep apnea)

## 2022-09-21 ENCOUNTER — TRANSCRIPTION ENCOUNTER (OUTPATIENT)
Age: 57
End: 2022-09-21

## 2022-09-21 ENCOUNTER — OUTPATIENT (OUTPATIENT)
Dept: OUTPATIENT SERVICES | Facility: HOSPITAL | Age: 57
LOS: 1 days | Discharge: HOME | End: 2022-09-21
Payer: COMMERCIAL

## 2022-09-21 ENCOUNTER — NON-APPOINTMENT (OUTPATIENT)
Age: 57
End: 2022-09-21

## 2022-09-21 ENCOUNTER — APPOINTMENT (OUTPATIENT)
Dept: UROLOGY | Facility: AMBULATORY SURGERY CENTER | Age: 57
End: 2022-09-21

## 2022-09-21 VITALS
RESPIRATION RATE: 16 BRPM | HEART RATE: 60 BPM | OXYGEN SATURATION: 99 % | TEMPERATURE: 97 F | DIASTOLIC BLOOD PRESSURE: 73 MMHG | SYSTOLIC BLOOD PRESSURE: 114 MMHG

## 2022-09-21 VITALS
HEIGHT: 68 IN | DIASTOLIC BLOOD PRESSURE: 81 MMHG | OXYGEN SATURATION: 99 % | RESPIRATION RATE: 16 BRPM | SYSTOLIC BLOOD PRESSURE: 130 MMHG | WEIGHT: 192.02 LBS | HEART RATE: 65 BPM | TEMPERATURE: 98 F

## 2022-09-21 DIAGNOSIS — Z90.89 ACQUIRED ABSENCE OF OTHER ORGANS: Chronic | ICD-10-CM

## 2022-09-21 PROCEDURE — G0416: CPT | Mod: 26

## 2022-09-21 PROCEDURE — 76872 US TRANSRECTAL: CPT | Mod: 26

## 2022-09-21 PROCEDURE — 55700: CPT

## 2022-09-21 RX ORDER — ATORVASTATIN CALCIUM 80 MG/1
1 TABLET, FILM COATED ORAL
Qty: 0 | Refills: 0 | DISCHARGE

## 2022-09-21 RX ORDER — LOSARTAN POTASSIUM 100 MG/1
1 TABLET, FILM COATED ORAL
Qty: 0 | Refills: 0 | DISCHARGE

## 2022-09-21 RX ORDER — ACETAMINOPHEN 500 MG
1000 TABLET ORAL ONCE
Refills: 0 | Status: COMPLETED | OUTPATIENT
Start: 2022-09-21 | End: 2022-09-21

## 2022-09-21 RX ORDER — ROSUVASTATIN CALCIUM 5 MG/1
1 TABLET ORAL
Qty: 0 | Refills: 0 | DISCHARGE

## 2022-09-21 RX ORDER — SODIUM CHLORIDE 9 MG/ML
1000 INJECTION, SOLUTION INTRAVENOUS
Refills: 0 | Status: DISCONTINUED | OUTPATIENT
Start: 2022-09-21 | End: 2022-09-21

## 2022-09-21 RX ORDER — ACETAMINOPHEN 500 MG
650 TABLET ORAL ONCE
Refills: 0 | Status: DISCONTINUED | OUTPATIENT
Start: 2022-09-21 | End: 2022-09-21

## 2022-09-21 RX ADMIN — Medication 1000 MILLIGRAM(S): at 09:38

## 2022-09-21 NOTE — ASU PREOP CHECKLIST - SELECT TESTS ORDERED
sleep study, stress echo/CBC/CMP/PT/PTT/INR/Urinalysis/EKG/Sickle Cell (black patients 3mos-10yr)/COVID-19

## 2022-09-21 NOTE — BRIEF OPERATIVE NOTE - NSICDXBRIEFPROCEDURE_GEN_ALL_CORE_FT
PROCEDURES:  Biopsy of prostate using magnetic resonance imaging-ultrasound fusion guidance 21-Sep-2022 13:37:10  Rajiv Ledesma
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
## 2022-09-21 NOTE — ASU DISCHARGE PLAN (ADULT/PEDIATRIC) - ASU DC SPECIAL INSTRUCTIONSFT
PROSTATE BIOPSY    GENERAL: Expect blood in the urine, stool, and ejaculate for up to two (2) months postoperatively. This is normal and to be expected after this procedure. Increase hydration to keep the urine as clear as possible.    SURGICAL WOUND: There are often lumps and bumps that can be felt in the perineum (skin between scrotum and anus) for up to two (2) months or more post operatively. These are of no concern and with time they will soften and disappear.  Any “black and blue” bruising areas will also resolve.  You may apply an ice-pack for 15 minutes out of every hour for the first 24 -36 hours to minimize pain and swelling. You may apply over the counter Bacitracin to the wound several times a day, and keep covered with over the counter gauze as necessary.    PAIN: You may have some intermittent pain for up to six (6) weeks post operatively. Pain does not signify any problem unless associated with fever, chills, or inability to void.  If you experience any fevers or chills please call immediately as this may be signs of an infection. You may take Tylenol (acetaminophen) 650-975mg and/or Motrin (ibuprofen) 400-600mg, both available over the counter, for pain every 6 hours as needed. Do not exceed 4000mg of Tylenol (acetaminophen) daily. You may alternate these medications such that you take one or the other every 3 hours for around the clock pain coverage.    ANTICOAGULATION: If you are taking any blood thinning medications, please discuss with your urologist prior to restarting these medications unless otherwise specified.    BATHING: You may shower with soap and water, and pat dry the needle insertion sites in the perineum. Avoid sitting in water for prolonged periods of time for the next few days.    DIET: You may resume your regular diet and regular medication regimen.    ACTIVITY: No heavy lifting or strenuous exercise until you are evaluated at your post-operative appointment. Otherwise, you may return to your usual level of physical activity.    FOLLOW-UP: If you did not already schedule your post-operative appointment, please call your urologist to schedule and follow-up appointment.    CALL YOUR UROLOGIST IF: You have any bleeding that does not stop, inability to void >8 hours, fever over 100.4 F, chills, persistent nausea/vomiting, changes in your incision concerning for infection, or if your pain is not controlled on your discharge pain medications.

## 2022-09-21 NOTE — ASU DISCHARGE PLAN (ADULT/PEDIATRIC) - NS MD DC FALL RISK RISK
For information on Fall & Injury Prevention, visit: [email or web address] OR  [email or web address] OR  https://www.cdc.gov/steadi/patient.html

## 2022-09-21 NOTE — ASU DISCHARGE PLAN (ADULT/PEDIATRIC) - CARE PROVIDER_API CALL
Ady Son)  Urology  [address] [place] [address]  Phone: [phone]  Fax: [phone]  Follow Up Time:

## 2022-09-22 ENCOUNTER — NON-APPOINTMENT (OUTPATIENT)
Age: 57
End: 2022-09-22

## 2022-09-22 PROBLEM — E78.00 PURE HYPERCHOLESTEROLEMIA, UNSPECIFIED: Chronic | Status: ACTIVE | Noted: 2022-09-21

## 2022-09-22 PROBLEM — Z87.438 PERSONAL HISTORY OF OTHER DISEASES OF MALE GENITAL ORGANS: Chronic | Status: ACTIVE | Noted: 2022-09-21

## 2022-09-22 PROBLEM — G47.33 OBSTRUCTIVE SLEEP APNEA (ADULT) (PEDIATRIC): Chronic | Status: ACTIVE | Noted: 2022-09-21

## 2022-09-26 LAB — SURGICAL PATHOLOGY STUDY: SIGNIFICANT CHANGE UP

## 2022-09-29 ENCOUNTER — APPOINTMENT (OUTPATIENT)
Dept: UROLOGY | Facility: CLINIC | Age: 57
End: 2022-09-29

## 2022-09-29 DIAGNOSIS — N40.1 BENIGN PROSTATIC HYPERPLASIA WITH LOWER URINARY TRACT SYMPMS: ICD-10-CM

## 2022-09-29 DIAGNOSIS — R35.1 BENIGN PROSTATIC HYPERPLASIA WITH LOWER URINARY TRACT SYMPMS: ICD-10-CM

## 2022-09-29 PROCEDURE — 99213 OFFICE O/P EST LOW 20 MIN: CPT | Mod: 95

## 2022-09-29 NOTE — HISTORY OF PRESENT ILLNESS
[FreeTextEntry1] : 56 year old , originally from [facility] presents for a urological evaluation (libido/erectile issue, LUTS, elevated PSA)\par , daughter (32 years old)\par \par Decreased libido x 3-4 years. GA 20. \par responds to PD5-I; but not interested in sexual intercourse\par \par Moderate chronic lower urinary tract symptoms, with nocturia x 2. \par Flow: Peak: 19.6ml/sec, Volume: 221.8cc (bell pattern)\par \par Elevated PSA (see full PSA history below). No family history of prostate cancer. \par \par PSA History\par 7/7/22 4.7\par 3/10/21 4.22\par 12/19/19 2.66\par 10/30/19 5.23\par 3/7/17 1.48\par \par 9.29.2022\par fu after perineal biopsy

## 2022-09-29 NOTE — REASON FOR VISIT
[Home] : at home, [unfilled] , at the time of the visit. [Medical Office: ([facility])___] : at the medical office located in  [Spouse] : spouse [Patient] : the patient [Follow-up Visit ___] : a follow-up visit  for [unfilled]

## 2022-09-29 NOTE — ASSESSMENT
[FreeTextEntry1] : 08.17.2022\par \par 56 year old   originally from [facility] presents for a urological evaluation\par , daughter (32 years old)\par \par Decreased libido x 3-4 years. GA 20. \par \par Longstanding moderate chronic lower urinary tract symptoms, with nocturia x 2. Had an evaluation for sleep apnea. NO VITALIY\par Flow: Peak: 19.6ml/sec, Volume: 221.8cc (bell pattern)\par Sono: 32cc PVR (done to rule out incomplete bladder emptying)\par Regarding his lower urinary tract symptoms, he discussed behavioral modification (decreasing afternoon fluid intake, decreased caffeine intake) in an effort to decrease the nocturia. His PVR and Flow are both satisfactory and do not suggest bladder outlet obstruction. He currently drinks 2 pots of coffee daily and two bottles of Ana Cristina. We agreed that he can decrease this to 1 pot or change to decaf. \par \par \par Elevated PSA (see full PSA history below). No family history of prostate cancer. \par \par PSA History\par 7/7/22 4.7\par 3/10/21 4.22\par 12/19/19 2.66\par 10/30/19 5.23\par 3/7/17 1.48\par \par I discussed possible causes of of an elevated PSA with Mr. Holland. \par We discussed need for confirmatory PSA.\par We discussed the risks and complications of prostate biopsy and discussed the procedure.\par We discussed that procedure is performed by placing a rectal probe and administering anesthesia locally.\par Biopsies are then taken with a needle. Multiple biopsies are taken, \par Risks of the procedure include hematuria, hematospermia, blood per rectum.\par Risks include infection, sepsis and even death.\par We discussed perineal vs transrectal biopsy\par We discussed decreased risk of perineal biopsy vs transrectal but associated greater discomfort \par We discussed options of: 1. local, 2. IV sedation in Operating room 3. po Valium\par Fleets enema should be taken.\par ASA and NSAID must be stopped 1 week prior to procedure.\par \par Regarding the decreased libido, we will proceed with a hormonal evaluation. He denies any martial discord relating to this issue, and seems uninterested in pursuing.\par \par \par \par Mr. Holland indicated his understanding of the plan and all questions were answered. \par \par Plan:\par 1. PSA, Testosterone\par 2. If PSA elevated, MRI Prostate (+/-)\par 3. Behavioral modification for LUTS\par 4. Follow up in three months (if PSA is normalized)\par \par \par 9.29.2022\par telemedicine visit with patient and wife\par We reviewed:\par \par MRI \par Size: 5.2 x 3.3 x 3.4 [transverse x AP x CC] cm.\par Volume: 30.5 mL .\par PSA density: 0.8 ng/mL/mL\par PSA density >0.15 ng/mL/mL: No\par Hemorrhage: None.\par Central gland: Moderate BPH. No suspicious nodule seen on T2-weighted imaging or on diffusion-weighted imaging.\par Peripheral zone: Mild bilateral patchy T2 hypointensities without restricted diffusion but with enhancement could represent prostatitis..\par LESION: #1\par Location: Left posterior lateral peripheral zone at apex of gland..\par Slice#: Series 5 Image 21.\par Size (transverse, AP, CC): 5.3 x 5.6 x 5.2 mm.\par T2-WI: Moderately hypointense\par DWI: Moderately to markedly hypointense on ADC map and markedly hyperintense on DWI.\par DCE: Early focal enhancement\par Extra-prostatic extension: None\par PI-RADS Assessment Category: 4\par \par We discussed and  recalculated \par PSAD 0.15\par \par \par We reviewed\par Biopsy\par Final Diagnosis\par 1. Prostate, left anterior apex, biopsy\par -Benign prostate tissue.\par 2. Prostate, left anterior base, biopsy\par -Benign prostate tissue.\par 3. Prostate, right anterior apex, biopsy\par -Benign prostate tissue.\par 4. Prostate, right anterior base, biopsy\par -Benign prostate tissue.\par 5. Prostate, midline apex, biopsy\par -Benign prostate tissue.\par 6. Prostate, midline base, biopsy\par -Benign prostate tissue.\par 7. Prostate, left posterior apex, biopsy\par -Benign prostate tissue.\par 8. Prostate, left posterior base, biopsy\par -Benign prostate tissue.\par 9. Prostate, right posterior apex, biopsy\par -Benign prostate tissue.\par 10. Prostate, right posterior base, biopsy\par -Benign prostate tissue.\par 11. Prostate, left lateral, biopsy\par -Benign prostate tissue.\par 12. Prostate, right lateral, biopsy\par -Benign prostate tissue.\par 13. Prostate, left PZPL MR target, biopsy\par -Benign prostate tissue.\par \par We discussed :\par Age-specific reference ranges — In men without prostate cancer, serum PSA reflects the amount of glandular epithelium, which in turn reflects prostate size. Thus, as prostate size increases with increasing age, the PSA concentration also rises; it increases at a faster rate in older adult men [</contents/measurement-of-prostate-specific-antigen/abstract/9>]. In one study of 471 men, the serum PSA concentration increased by approximately 3.2 percent (0.04 ng/mL) per year for a healthy 60-year-old [10 </contents/measurement-of-prostate-specific-antigen/abstract/10>]. As a result, different normal reference ranges may be appropriate based upon a man's age [11,12 </contents/measurement-of-prostate-specific-antigen/abstract/11,12>]:\par ?40 to 49 years – 0 to 2.5 ng/mL\par ?50 to 59 years – 0 to 3.5 ng/mL\par ?60 to 69 years – 0 to 4.5 ng/mL\par ?70 to 79 years – 0 to 6.5 ng/mL \par \par We discussed concept of PSAD as opposed to PSA age\par We again reviewed technique of FUSION BIOPYS\par We reviewed possibility of misseed significant lesion and delay in diagnosis\par We discussed need to carefully monitor PSA and possible repeat MRI or biopsy based upon of PSA dynamics\par The [facility]  expressed fully understanding of the information provided, the consequences and the management.\par \par Plan:\par 1. PSA in 2.2023\par 2. appt made\par  alfonzo cortez daughter Vanessa elan Douglas

## 2022-12-21 ENCOUNTER — TRANSCRIPTION ENCOUNTER (OUTPATIENT)
Age: 57
End: 2022-12-21

## 2022-12-21 RX ORDER — VARDENAFIL 10 MG/1
10 TABLET, FILM COATED ORAL DAILY
Qty: 10 | Refills: 5 | Status: ACTIVE | COMMUNITY
Start: 2022-06-21 | End: 1900-01-01

## 2022-12-22 ENCOUNTER — TRANSCRIPTION ENCOUNTER (OUTPATIENT)
Age: 57
End: 2022-12-22

## 2023-03-20 ENCOUNTER — TRANSCRIPTION ENCOUNTER (OUTPATIENT)
Age: 58
End: 2023-03-20

## 2023-03-20 LAB
PSA FREE FLD-MCNC: 19 %
PSA FREE SERPL-MCNC: 0.64 NG/ML
PSA SERPL-MCNC: 3.34 NG/ML

## 2023-03-22 ENCOUNTER — TRANSCRIPTION ENCOUNTER (OUTPATIENT)
Age: 58
End: 2023-03-22

## 2023-03-27 ENCOUNTER — RX RENEWAL (OUTPATIENT)
Age: 58
End: 2023-03-27

## 2023-03-27 RX ORDER — LOSARTAN POTASSIUM 25 MG/1
25 TABLET, FILM COATED ORAL
Qty: 90 | Refills: 1 | Status: ACTIVE | COMMUNITY
Start: 2018-01-02 | End: 1900-01-01

## 2023-04-06 ENCOUNTER — LABORATORY RESULT (OUTPATIENT)
Age: 58
End: 2023-04-06

## 2023-04-06 ENCOUNTER — APPOINTMENT (OUTPATIENT)
Dept: UROLOGY | Facility: CLINIC | Age: 58
End: 2023-04-06
Payer: COMMERCIAL

## 2023-04-06 DIAGNOSIS — R68.82 DECREASED LIBIDO: ICD-10-CM

## 2023-04-06 PROCEDURE — 51798 US URINE CAPACITY MEASURE: CPT

## 2023-04-06 PROCEDURE — 99214 OFFICE O/P EST MOD 30 MIN: CPT | Mod: 25

## 2023-04-06 NOTE — HISTORY OF PRESENT ILLNESS
[FreeTextEntry1] : 56 year old , originally from [facility] presents for a urological evaluation (libido/erectile issue, LUTS, elevated PSA)\par , daughter (32 years old)\par \par Decreased libido x 3-4 years. GA 20. \par responds to PD5-I; but not interested in sexual intercourse\par \par Moderate chronic lower urinary tract symptoms, with nocturia x 2. \par Flow: Peak: 19.6ml/sec, Volume: 221.8cc (bell pattern)\par \par Elevated PSA (see full PSA history below). No family history of prostate cancer. \par \par PSA History\par 7/7/22 4.7\par 3/10/21 4.22\par 12/19/19 2.66\par 10/30/19 5.23\par 3/7/17 1.48\par \par 9.29.2022\par fu after perineal biopsy\par \par 4.6.2023\par IPSS 6\par GA 18\par continues to have good erections but not interested\par taking levitra

## 2023-04-06 NOTE — ASSESSMENT
[FreeTextEntry1] : 08.17.2022\par \par 56 year old   originally from [facility] presents for a urological evaluation\par , daughter (32 years old)\par \par Decreased libido x 3-4 years. GA 20. \par \par Longstanding moderate chronic lower urinary tract symptoms, with nocturia x 2. Had an evaluation for sleep apnea. NO VITALIY\par Flow: Peak: 19.6ml/sec, Volume: 221.8cc (bell pattern)\par Sono: 32cc PVR (done to rule out incomplete bladder emptying)\par Regarding his lower urinary tract symptoms, he discussed behavioral modification (decreasing afternoon fluid intake, decreased caffeine intake) in an effort to decrease the nocturia. His PVR and Flow are both satisfactory and do not suggest bladder outlet obstruction. He currently drinks 2 pots of coffee daily and two bottles of Ana Cristina. We agreed that he can decrease this to 1 pot or change to decaf. \par \par \par Elevated PSA (see full PSA history below). No family history of prostate cancer. \par \par PSA History\par 7/7/22 4.7\par 3/10/21 4.22\par 12/19/19 2.66\par 10/30/19 5.23\par 3/7/17 1.48\par \par I discussed possible causes of of an elevated PSA with Mr. Holland. \par We discussed need for confirmatory PSA.\par We discussed the risks and complications of prostate biopsy and discussed the procedure.\par We discussed that procedure is performed by placing a rectal probe and administering anesthesia locally.\par Biopsies are then taken with a needle. Multiple biopsies are taken, \par Risks of the procedure include hematuria, hematospermia, blood per rectum.\par Risks include infection, sepsis and even death.\par We discussed perineal vs transrectal biopsy\par We discussed decreased risk of perineal biopsy vs transrectal but associated greater discomfort \par We discussed options of: 1. local, 2. IV sedation in Operating room 3. po Valium\par Fleets enema should be taken.\par ASA and NSAID must be stopped 1 week prior to procedure.\par \par Regarding the decreased libido, we will proceed with a hormonal evaluation. He denies any martial discord relating to this issue, and seems uninterested in pursuing.\par \par \par \par Mr. Holland indicated his understanding of the plan and all questions were answered. \par \par Plan:\par 1. PSA, Testosterone\par 2. If PSA elevated, MRI Prostate (+/-)\par 3. Behavioral modification for LUTS\par 4. Follow up in three months (if PSA is normalized)\par \par \par 9.29.2022\par telemedicine visit with patient and wife\par We reviewed:\par \par MRI \par Size: 5.2 x 3.3 x 3.4 [transverse x AP x CC] cm.\par Volume: 30.5 mL .\par PSA density: 0.8 ng/mL/mL\par PSA density >0.15 ng/mL/mL: No\par Hemorrhage: None.\par Central gland: Moderate BPH. No suspicious nodule seen on T2-weighted imaging or on diffusion-weighted imaging.\par Peripheral zone: Mild bilateral patchy T2 hypointensities without restricted diffusion but with enhancement could represent prostatitis..\par LESION: #1\par Location: Left posterior lateral peripheral zone at apex of gland..\par Slice#: Series 5 Image 21.\par Size (transverse, AP, CC): 5.3 x 5.6 x 5.2 mm.\par T2-WI: Moderately hypointense\par DWI: Moderately to markedly hypointense on ADC map and markedly hyperintense on DWI.\par DCE: Early focal enhancement\par Extra-prostatic extension: None\par PI-RADS Assessment Category: 4\par \par We discussed and  recalculated \par PSAD 0.15\par \par \par We reviewed\par Biopsy\par Final Diagnosis\par 1. Prostate, left anterior apex, biopsy\par -Benign prostate tissue.\par 2. Prostate, left anterior base, biopsy\par -Benign prostate tissue.\par 3. Prostate, right anterior apex, biopsy\par -Benign prostate tissue.\par 4. Prostate, right anterior base, biopsy\par -Benign prostate tissue.\par 5. Prostate, midline apex, biopsy\par -Benign prostate tissue.\par 6. Prostate, midline base, biopsy\par -Benign prostate tissue.\par 7. Prostate, left posterior apex, biopsy\par -Benign prostate tissue.\par 8. Prostate, left posterior base, biopsy\par -Benign prostate tissue.\par 9. Prostate, right posterior apex, biopsy\par -Benign prostate tissue.\par 10. Prostate, right posterior base, biopsy\par -Benign prostate tissue.\par 11. Prostate, left lateral, biopsy\par -Benign prostate tissue.\par 12. Prostate, right lateral, biopsy\par -Benign prostate tissue.\par 13. Prostate, left PZPL MR target, biopsy\par -Benign prostate tissue.\par \par We discussed :\par Age-specific reference ranges — In men without prostate cancer, serum PSA reflects the amount of glandular epithelium, which in turn reflects prostate size. Thus, as prostate size increases with increasing age, the PSA concentration also rises; it increases at a faster rate in older adult men [</contents/measurement-of-prostate-specific-antigen/abstract/9>]. In one study of 471 men, the serum PSA concentration increased by approximately 3.2 percent (0.04 ng/mL) per year for a healthy 60-year-old [10 </contents/measurement-of-prostate-specific-antigen/abstract/10>]. As a result, different normal reference ranges may be appropriate based upon a man's age [11,12 </contents/measurement-of-prostate-specific-antigen/abstract/11,12>]:\par ?40 to 49 years – 0 to 2.5 ng/mL\par ?50 to 59 years – 0 to 3.5 ng/mL\par ?60 to 69 years – 0 to 4.5 ng/mL\par ?70 to 79 years – 0 to 6.5 ng/mL \par \par We discussed concept of PSAD as opposed to PSA age\par We again reviewed technique of FUSION BIOPYS\par We reviewed possibility of misseed significant lesion and delay in diagnosis\par We discussed need to carefully monitor PSA and possible repeat MRI or biopsy based upon of PSA dynamics\par [facility]  expressed fully understanding of the information provided, the consequences and the management.\par \par Plan:\par 1. PSA in 2.2023\par 2. appt made\par \par \par 4.6.2023\par Patient returns after previous perineal prostate biopsy for elevated PSA.  His recent PSA measures 3.34 his PSA has varied from as high as 5.23 in October 2019.  In August 2022 it was 2.72.  He previously underwent a perineal biopsy which did not reveal any prostate cancer or areas of concern.  We discussed continuing to monitor his PSA again in 6 months.  We also discussed MDX.  In light of the normal PSA at this point I do not think that is warranted.  He is breast understanding regarding this discussion.\par \par His sexual function continues to respond to Levitra.  His primary concern is his libido.\par His previous  testosterone was 366.  In light of his loss of libido it is reasonable to repeat his testosterone this morning to see if there is an abnormality affecting his desire.\par \par Urinary symptoms stable\par flow non diagnostic\par PVR 0\par \par Plan:\par testosterone\par prolactin\par estradiol\par urinalysis \par fu 6 months\par

## 2023-04-06 NOTE — PHYSICAL EXAM
[Urethral Meatus] : meatus normal [Epididymis] : the epididymides were normal [Testes Tenderness] : no tenderness of the testes [Rectal Exam - Rectum] : digital rectal exam was normal [Prostate Enlargement] : the prostate was not enlarged [Prostate Tenderness] : the prostate was not tender [No Prostate Nodules] : no prostate nodules

## 2023-04-07 LAB
APPEARANCE: CLEAR
BILIRUBIN URINE: NEGATIVE
BLOOD URINE: NEGATIVE
COLOR: YELLOW
ESTRADIOL SERPL-MCNC: 22 PG/ML
GLUCOSE QUALITATIVE U: NEGATIVE
KETONES URINE: NEGATIVE
LEUKOCYTE ESTERASE URINE: NEGATIVE
NITRITE URINE: NEGATIVE
PH URINE: 6
PROLACTIN SERPL-MCNC: 4.9 NG/ML
PROTEIN URINE: NORMAL
SPECIFIC GRAVITY URINE: 1.03
UROBILINOGEN URINE: ABNORMAL

## 2023-04-11 LAB
TESTOST FREE SERPL-MCNC: 4.3 PG/ML
TESTOST SERPL-MCNC: 276 NG/DL

## 2023-04-12 ENCOUNTER — TRANSCRIPTION ENCOUNTER (OUTPATIENT)
Age: 58
End: 2023-04-12

## 2023-04-24 ENCOUNTER — TRANSCRIPTION ENCOUNTER (OUTPATIENT)
Age: 58
End: 2023-04-24

## 2023-04-24 LAB
LH SERPL-ACNC: 4.5 IU/L
PSA FREE FLD-MCNC: 20 %
PSA FREE SERPL-MCNC: 0.56 NG/ML
PSA SERPL-MCNC: 2.86 NG/ML

## 2023-04-30 LAB
TESTOST FREE SERPL-MCNC: 5.7 PG/ML
TESTOST SERPL-MCNC: 362 NG/DL

## 2023-08-10 ENCOUNTER — APPOINTMENT (OUTPATIENT)
Dept: INTERNAL MEDICINE | Facility: CLINIC | Age: 58
End: 2023-08-10
Payer: COMMERCIAL

## 2023-08-10 VITALS
WEIGHT: 191 LBS | BODY MASS INDEX: 29.98 KG/M2 | OXYGEN SATURATION: 97 % | TEMPERATURE: 97.8 F | DIASTOLIC BLOOD PRESSURE: 88 MMHG | HEART RATE: 102 BPM | SYSTOLIC BLOOD PRESSURE: 127 MMHG | HEIGHT: 67 IN

## 2023-08-10 DIAGNOSIS — B36.9 SUPERFICIAL MYCOSIS, UNSPECIFIED: ICD-10-CM

## 2023-08-10 PROCEDURE — 99213 OFFICE O/P EST LOW 20 MIN: CPT

## 2023-08-10 RX ORDER — ECONAZOLE NITRATE 10 MG/G
1 CREAM TOPICAL TWICE DAILY
Qty: 1 | Refills: 0 | Status: ACTIVE | COMMUNITY
Start: 2023-08-10 | End: 1900-01-01

## 2023-08-10 RX ORDER — TRIAMCINOLONE ACETONIDE 5 MG/G
0.5 OINTMENT TOPICAL 3 TIMES DAILY
Qty: 2 | Refills: 0 | Status: ACTIVE | COMMUNITY
Start: 2023-08-10 | End: 1900-01-01

## 2023-08-14 ENCOUNTER — TRANSCRIPTION ENCOUNTER (OUTPATIENT)
Age: 58
End: 2023-08-14

## 2023-08-14 RX ORDER — CLOTRIMAZOLE 10 MG/G
1 CREAM TOPICAL TWICE DAILY
Qty: 1 | Refills: 0 | Status: ACTIVE | COMMUNITY
Start: 2023-08-14 | End: 1900-01-01

## 2023-08-28 NOTE — HISTORY OF PRESENT ILLNESS
[FreeTextEntry1] : rash [de-identified] : Took doxy for 21 days for Lyme exposure, his Lyme symptoms have resolved. Patient states that he has yeast all over the place, primarily on buttock. Is taking fluconazole 150mg once daily, is on day 6 of a 10-day course as prescribed by derm in [place] along with antifungal powder and cream. He is also taking probiotics. Patient also has a pimple that keeps recurring.   No nausea, vomiting, diarrhea, and constipation. No chest pain or shortness of breath.

## 2023-09-07 ENCOUNTER — TRANSCRIPTION ENCOUNTER (OUTPATIENT)
Age: 58
End: 2023-09-07

## 2024-02-13 PROBLEM — N48.1 BALANITIS: Status: ACTIVE | Noted: 2023-08-14

## 2024-02-13 PROBLEM — R97.20 ELEVATED PSA: Status: ACTIVE | Noted: 2022-08-17

## 2024-02-13 PROBLEM — R79.89 LOW TESTOSTERONE IN MALE: Status: ACTIVE | Noted: 2023-04-07

## 2024-02-14 ENCOUNTER — APPOINTMENT (OUTPATIENT)
Dept: UROLOGY | Facility: CLINIC | Age: 59
End: 2024-02-14
Payer: COMMERCIAL

## 2024-02-14 VITALS
HEART RATE: 84 BPM | HEIGHT: 67 IN | DIASTOLIC BLOOD PRESSURE: 95 MMHG | SYSTOLIC BLOOD PRESSURE: 156 MMHG | WEIGHT: 185 LBS | BODY MASS INDEX: 29.03 KG/M2 | OXYGEN SATURATION: 98 %

## 2024-02-14 DIAGNOSIS — N52.9 MALE ERECTILE DYSFUNCTION, UNSPECIFIED: ICD-10-CM

## 2024-02-14 DIAGNOSIS — R79.89 OTHER SPECIFIED ABNORMAL FINDINGS OF BLOOD CHEMISTRY: ICD-10-CM

## 2024-02-14 DIAGNOSIS — Z86.19 PERSONAL HISTORY OF OTHER INFECTIOUS AND PARASITIC DISEASES: ICD-10-CM

## 2024-02-14 DIAGNOSIS — N48.1 BALANITIS: ICD-10-CM

## 2024-02-14 DIAGNOSIS — U07.1 COVID-19: ICD-10-CM

## 2024-02-14 DIAGNOSIS — R97.20 ELEVATED PROSTATE, SPECIFIC ANTIGEN [PSA]: ICD-10-CM

## 2024-02-14 PROCEDURE — 99214 OFFICE O/P EST MOD 30 MIN: CPT | Mod: 25

## 2024-02-14 PROCEDURE — 51741 ELECTRO-UROFLOWMETRY FIRST: CPT

## 2024-02-14 PROCEDURE — 51798 US URINE CAPACITY MEASURE: CPT

## 2024-02-14 RX ORDER — NYSTATIN AND TRIAMCINOLONE ACETONIDE 100000; 1 MG/G; MG/G
100000-0.1 CREAM TOPICAL TWICE DAILY
Qty: 15 | Refills: 0 | Status: ACTIVE | COMMUNITY
Start: 2024-02-14 | End: 1900-01-01

## 2024-02-14 NOTE — ASSESSMENT
[FreeTextEntry1] : 08.17.2022  56 year old   originally from [facility] presents for a urological evaluation , daughter (32 years old)  Decreased libido x 3-4 years. GA 20.   Longstanding moderate chronic lower urinary tract symptoms, with nocturia x 2. Had an evaluation for sleep apnea. NO VITALIY Flow: Peak: 19.6ml/sec, Volume: 221.8cc (bell pattern) Sono: 32cc PVR (done to rule out incomplete bladder emptying) Regarding his lower urinary tract symptoms, he discussed behavioral modification (decreasing afternoon fluid intake, decreased caffeine intake) in an effort to decrease the nocturia. His PVR and Flow are both satisfactory and do not suggest bladder outlet obstruction. He currently drinks 2 pots of coffee daily and two bottles of Ana Cristina. We agreed that he can decrease this to 1 pot or change to decaf.    Elevated PSA (see full PSA history below). No family history of prostate cancer.   PSA History 7/7/22 4.7 3/10/21 4.22 12/19/19 2.66 10/30/19 5.23 3/7/17 1.48  I discussed possible causes of of an elevated PSA with Mr. Holland.  We discussed need for confirmatory PSA. We discussed the risks and complications of prostate biopsy and discussed the procedure. We discussed that procedure is performed by placing a rectal probe and administering anesthesia locally. Biopsies are then taken with a needle. Multiple biopsies are taken,  Risks of the procedure include hematuria, hematospermia, blood per rectum. Risks include infection, sepsis and even death. We discussed perineal vs transrectal biopsy We discussed decreased risk of perineal biopsy vs transrectal but associated greater discomfort  We discussed options of: 1. local, 2. IV sedation in Operating room 3. po Valium Fleets enema should be taken. ASA and NSAID must be stopped 1 week prior to procedure.  Regarding the decreased libido, we will proceed with a hormonal evaluation. He denies any martial discord relating to this issue, and seems uninterested in pursuing.    Mr. Holland indicated his understanding of the plan and all questions were answered.   Plan: 1. PSA, Testosterone 2. If PSA elevated, MRI Prostate (+/-) 3. Behavioral modification for LUTS 4. Follow up in three months (if PSA is normalized)   9.29.2022 telemedicine visit with patient and wife We reviewed:  MRI  Size: 5.2 x 3.3 x 3.4 [transverse x AP x CC] cm. Volume: 30.5 mL . PSA density: 0.8 ng/mL/mL PSA density >0.15 ng/mL/mL: No Hemorrhage: None. Central gland: Moderate BPH. No suspicious nodule seen on T2-weighted imaging or on diffusion-weighted imaging. Peripheral zone: Mild bilateral patchy T2 hypointensities without restricted diffusion but with enhancement could represent prostatitis.. LESION: #1 Location: Left posterior lateral peripheral zone at apex of gland.. Slice#: Series 5 Image 21. Size (transverse, AP, CC): 5.3 x 5.6 x 5.2 mm. T2-WI: Moderately hypointense DWI: Moderately to markedly hypointense on ADC map and markedly hyperintense on DWI. DCE: Early focal enhancement Extra-prostatic extension: None PI-RADS Assessment Category: 4  We discussed and  recalculated  PSAD 0.15   We reviewed Biopsy Final Diagnosis 1. Prostate, left anterior apex, biopsy -Benign prostate tissue. 2. Prostate, left anterior base, biopsy -Benign prostate tissue. 3. Prostate, right anterior apex, biopsy -Benign prostate tissue. 4. Prostate, right anterior base, biopsy -Benign prostate tissue. 5. Prostate, midline apex, biopsy -Benign prostate tissue. 6. Prostate, midline base, biopsy -Benign prostate tissue. 7. Prostate, left posterior apex, biopsy -Benign prostate tissue. 8. Prostate, left posterior base, biopsy -Benign prostate tissue. 9. Prostate, right posterior apex, biopsy -Benign prostate tissue. 10. Prostate, right posterior base, biopsy -Benign prostate tissue. 11. Prostate, left lateral, biopsy -Benign prostate tissue. 12. Prostate, right lateral, biopsy -Benign prostate tissue. 13. Prostate, left PZPL MR target, biopsy -Benign prostate tissue.  We discussed : Age-specific reference ranges - In men without prostate cancer, serum PSA reflects the amount of glandular epithelium, which in turn reflects prostate size. Thus, as prostate size increases with increasing age, the PSA concentration also rises; it increases at a faster rate in older adult men [</contents/measurement-of-prostate-specific-antigen/abstract/9>]. In one study of 471 men, the serum PSA concentration increased by approximately 3.2 percent (0.04 ng/mL) per year for a healthy 60-year-old [10 </contents/measurement-of-prostate-specific-antigen/abstract/10>]. As a result, different normal reference ranges may be appropriate based upon a man's age [11,12 </contents/measurement-of-prostate-specific-antigen/abstract/11,12>]: ?40 to 49 years - 0 to 2.5 ng/mL ?50 to 59 years - 0 to 3.5 ng/mL ?60 to 69 years - 0 to 4.5 ng/mL ?70 to 79 years - 0 to 6.5 ng/mL   We discussed concept of PSAD as opposed to PSA age We again reviewed technique of FUSION BIOPYS We reviewed possibility of misseed significant lesion and delay in diagnosis We discussed need to carefully monitor PSA and possible repeat MRI or biopsy based upon of PSA dynamics [facility]  expressed fully understanding of the information provided, the consequences and the management.  Plan: 1. PSA in 2.2023 2. appt made   4.6.2023 Patient returns after previous perineal prostate biopsy for elevated PSA.  His recent PSA measures 3.34 his PSA has varied from as high as 5.23 in October 2019.  In August 2022 it was 2.72.  He previously underwent a perineal biopsy which did not reveal any prostate cancer or areas of concern.  We discussed continuing to monitor his PSA again in 6 months.  We also discussed MDX.  In light of the normal PSA at this point I do not think that is warranted.  He is breast understanding regarding this discussion.  His sexual function continues to respond to Levitra.  His primary concern is his libido. His previous  testosterone was 366.  In light of his loss of libido it is reasonable to repeat his testosterone this morning to see if there is an abnormality affecting his desire.  Urinary symptoms stable flow non diagnostic PVR 0  Plan: testosterone prolactin estradiol urinalysis  fu 6 months  2.14.2024 Patient returns.  He had an episode of COVID followed by Lyme disease he was treated with antibiotics and developed candidiasis.  He complains now of recurrent penile discomfort.  On examination he has mild balanitis he.  He is uncircumcised.  His foreskin is easily retractable.  He did not treat the this area with antifungal medications.  We will start him on triamcinolone nystatin for a 1 week..  Related to his PSA.  He is status post biopsy.  He will have a repeat PSA today.  His urinary symptoms are essentially related to his caffeine intake.  He states he is "addicted to caffeine.  They are quite variable.  His flow is 11.5 cc/s with a voided volume of 95.5 cc.  His postvoid residual is 29.  Plan: 1.  PSA 2.  Urinalysis 3.  Triamcinolone nystatin twice a day for 7 days 4.  Continue Levitra as needed 5.  discussed reduction of caffeine 8 strong cups of coffee /day) 6. fu 6 months

## 2024-02-14 NOTE — PHYSICAL EXAM
[Bowel Sounds] : normal bowel sounds [Abdomen Tenderness] : non-tender [] : no hepato-splenomegaly [Abdomen Mass (___ Cm)] : no abdominal mass palpated [Abdomen Hernia] : no hernia was discovered [Costovertebral Angle Tenderness] : no ~M costovertebral angle tenderness [Penis Abnormality] : normal uncircumcised penis [Epididymis] : the epididymides were normal [Testes Tenderness] : no tenderness of the testes [Prostate Enlargement] : the prostate was not enlarged [Prostate Tenderness] : the prostate was not tender [de-identified] : flow 11.5 voided volume 95; PVR 29; mild balantis

## 2024-02-14 NOTE — HISTORY OF PRESENT ILLNESS
[FreeTextEntry1] : 56 year old , originally from [facility] presents for a urological evaluation (libido/erectile issue, LUTS, elevated PSA) , daughter (32 years old)  Decreased libido x 3-4 years. GA 20.  responds to PD5-I; but not interested in sexual intercourse  Moderate chronic lower urinary tract symptoms, with nocturia x 2.  Flow: Peak: 19.6ml/sec, Volume: 221.8cc (bell pattern)  Elevated PSA (see full PSA history below). No family history of prostate cancer.   PSA History 7/7/22 4.7 3/10/21 4.22 12/19/19 2.66 10/30/19 5.23 3/7/17 1.48  9.29.2022 fu after perineal biopsy  4.6.2023 IPSS 6 GA 16 continues to have good erections but not interested taking levitra  2.14.2024 s/p covid s/p lyme treated with doxyclyine and developed candida and balantits now recurrent pain  not interested in sexual activity  secondary to penile infections  [Currently Experiencing ___] :  [unfilled] [Urinary Frequency] : urinary frequency [Nocturia] : nocturia

## 2024-02-15 ENCOUNTER — TRANSCRIPTION ENCOUNTER (OUTPATIENT)
Age: 59
End: 2024-02-15

## 2024-02-15 LAB
APPEARANCE: CLEAR
BILIRUBIN URINE: NEGATIVE
BLOOD URINE: NEGATIVE
COLOR: NORMAL
GLUCOSE QUALITATIVE U: NEGATIVE MG/DL
KETONES URINE: NEGATIVE MG/DL
LEUKOCYTE ESTERASE URINE: NEGATIVE
NITRITE URINE: NEGATIVE
PH URINE: 7.5
PROTEIN URINE: NEGATIVE MG/DL
PSA FREE FLD-MCNC: 20 %
PSA FREE SERPL-MCNC: 0.72 NG/ML
PSA SERPL-MCNC: 3.69 NG/ML
SPECIFIC GRAVITY URINE: 1.01
UROBILINOGEN URINE: 0.2 MG/DL

## 2024-02-20 ENCOUNTER — TRANSCRIPTION ENCOUNTER (OUTPATIENT)
Age: 59
End: 2024-02-20

## 2024-03-01 ENCOUNTER — RX RENEWAL (OUTPATIENT)
Age: 59
End: 2024-03-01

## 2024-03-01 RX ORDER — ROSUVASTATIN CALCIUM 20 MG/1
20 TABLET, FILM COATED ORAL DAILY
Qty: 90 | Refills: 1 | Status: ACTIVE | COMMUNITY
Start: 2019-02-01 | End: 1900-01-01

## 2024-08-13 ENCOUNTER — APPOINTMENT (OUTPATIENT)
Dept: UROLOGY | Facility: CLINIC | Age: 59
End: 2024-08-13
Payer: COMMERCIAL

## 2024-08-13 DIAGNOSIS — N52.9 MALE ERECTILE DYSFUNCTION, UNSPECIFIED: ICD-10-CM

## 2024-08-13 DIAGNOSIS — R68.82 DECREASED LIBIDO: ICD-10-CM

## 2024-08-13 DIAGNOSIS — R93.89 ABNORMAL FINDINGS ON DIAGNOSTIC IMAGING OF OTHER SPECIFIED BODY STRUCTURES: ICD-10-CM

## 2024-08-13 DIAGNOSIS — N48.1 BALANITIS: ICD-10-CM

## 2024-08-13 DIAGNOSIS — R97.20 ELEVATED PROSTATE, SPECIFIC ANTIGEN [PSA]: ICD-10-CM

## 2024-08-13 DIAGNOSIS — R35.1 BENIGN PROSTATIC HYPERPLASIA WITH LOWER URINARY TRACT SYMPMS: ICD-10-CM

## 2024-08-13 DIAGNOSIS — R35.1 NOCTURIA: ICD-10-CM

## 2024-08-13 DIAGNOSIS — R79.89 OTHER SPECIFIED ABNORMAL FINDINGS OF BLOOD CHEMISTRY: ICD-10-CM

## 2024-08-13 DIAGNOSIS — N40.1 BENIGN PROSTATIC HYPERPLASIA WITH LOWER URINARY TRACT SYMPMS: ICD-10-CM

## 2024-08-13 PROCEDURE — 99214 OFFICE O/P EST MOD 30 MIN: CPT | Mod: 25

## 2024-08-13 PROCEDURE — 51798 US URINE CAPACITY MEASURE: CPT

## 2024-08-13 PROCEDURE — G2211 COMPLEX E/M VISIT ADD ON: CPT | Mod: NC

## 2024-08-13 NOTE — ASSESSMENT
[FreeTextEntry1] : 08.17.2022  56 year old   originally from [facility] presents for a urological evaluation , daughter (32 years old)  Decreased libido x 3-4 years. GA 20.   Longstanding moderate chronic lower urinary tract symptoms, with nocturia x 2. Had an evaluation for sleep apnea. NO VITALIY Flow: Peak: 19.6ml/sec, Volume: 221.8cc (bell pattern) Sono: 32cc PVR (done to rule out incomplete bladder emptying) Regarding his lower urinary tract symptoms, he discussed behavioral modification (decreasing afternoon fluid intake, decreased caffeine intake) in an effort to decrease the nocturia. His PVR and Flow are both satisfactory and do not suggest bladder outlet obstruction. He currently drinks 2 pots of coffee daily and two bottles of Ana Cristina. We agreed that he can decrease this to 1 pot or change to decaf.    Elevated PSA (see full PSA history below). No family history of prostate cancer.   PSA History 7/7/22 4.7 3/10/21 4.22 12/19/19 2.66 10/30/19 5.23 3/7/17 1.48  I discussed possible causes of of an elevated PSA with Mr. Holland.  We discussed need for confirmatory PSA. We discussed the risks and complications of prostate biopsy and discussed the procedure. We discussed that procedure is performed by placing a rectal probe and administering anesthesia locally. Biopsies are then taken with a needle. Multiple biopsies are taken,  Risks of the procedure include hematuria, hematospermia, blood per rectum. Risks include infection, sepsis and even death. We discussed perineal vs transrectal biopsy We discussed decreased risk of perineal biopsy vs transrectal but associated greater discomfort  We discussed options of: 1. local, 2. IV sedation in Operating room 3. po Valium Fleets enema should be taken. ASA and NSAID must be stopped 1 week prior to procedure.  Regarding the decreased libido, we will proceed with a hormonal evaluation. He denies any martial discord relating to this issue, and seems uninterested in pursuing.    Mr. Holland indicated his understanding of the plan and all questions were answered.   Plan: 1. PSA, Testosterone 2. If PSA elevated, MRI Prostate (+/-) 3. Behavioral modification for LUTS 4. Follow up in three months (if PSA is normalized)   9.29.2022 telemedicine visit with patient and wife We reviewed:  MRI  Size: 5.2 x 3.3 x 3.4 [transverse x AP x CC] cm. Volume: 30.5 mL . PSA density: 0.8 ng/mL/mL PSA density >0.15 ng/mL/mL: No Hemorrhage: None. Central gland: Moderate BPH. No suspicious nodule seen on T2-weighted imaging or on diffusion-weighted imaging. Peripheral zone: Mild bilateral patchy T2 hypointensities without restricted diffusion but with enhancement could represent prostatitis.. LESION: #1 Location: Left posterior lateral peripheral zone at apex of gland.. Slice#: Series 5 Image 21. Size (transverse, AP, CC): 5.3 x 5.6 x 5.2 mm. T2-WI: Moderately hypointense DWI: Moderately to markedly hypointense on ADC map and markedly hyperintense on DWI. DCE: Early focal enhancement Extra-prostatic extension: None PI-RADS Assessment Category: 4  We discussed and  recalculated  PSAD 0.15   We reviewed Biopsy Final Diagnosis 1. Prostate, left anterior apex, biopsy -Benign prostate tissue. 2. Prostate, left anterior base, biopsy -Benign prostate tissue. 3. Prostate, right anterior apex, biopsy -Benign prostate tissue. 4. Prostate, right anterior base, biopsy -Benign prostate tissue. 5. Prostate, midline apex, biopsy -Benign prostate tissue. 6. Prostate, midline base, biopsy -Benign prostate tissue. 7. Prostate, left posterior apex, biopsy -Benign prostate tissue. 8. Prostate, left posterior base, biopsy -Benign prostate tissue. 9. Prostate, right posterior apex, biopsy -Benign prostate tissue. 10. Prostate, right posterior base, biopsy -Benign prostate tissue. 11. Prostate, left lateral, biopsy -Benign prostate tissue. 12. Prostate, right lateral, biopsy -Benign prostate tissue. 13. Prostate, left PZPL MR target, biopsy -Benign prostate tissue.  We discussed : Age-specific reference ranges - In men without prostate cancer, serum PSA reflects the amount of glandular epithelium, which in turn reflects prostate size. Thus, as prostate size increases with increasing age, the PSA concentration also rises; it increases at a faster rate in older adult men [</contents/measurement-of-prostate-specific-antigen/abstract/9>]. In one study of 471 men, the serum PSA concentration increased by approximately 3.2 percent (0.04 ng/mL) per year for a healthy 60-year-old [10 </contents/measurement-of-prostate-specific-antigen/abstract/10>]. As a result, different normal reference ranges may be appropriate based upon a man's age [11,12 </contents/measurement-of-prostate-specific-antigen/abstract/11,12>]: ?40 to 49 years - 0 to 2.5 ng/mL ?50 to 59 years - 0 to 3.5 ng/mL ?60 to 69 years - 0 to 4.5 ng/mL ?70 to 79 years - 0 to 6.5 ng/mL   We discussed concept of PSAD as opposed to PSA age We again reviewed technique of FUSION BIOPYS We reviewed possibility of misseed significant lesion and delay in diagnosis We discussed need to carefully monitor PSA and possible repeat MRI or biopsy based upon of PSA dynamics [facility]  expressed fully understanding of the information provided, the consequences and the management.  Plan: 1. PSA in 2.2023 2. appt made   4.6.2023 Patient returns after previous perineal prostate biopsy for elevated PSA.  His recent PSA measures 3.34 his PSA has varied from as high as 5.23 in October 2019.  In August 2022 it was 2.72.  He previously underwent a perineal biopsy which did not reveal any prostate cancer or areas of concern.  We discussed continuing to monitor his PSA again in 6 months.  We also discussed MDX.  In light of the normal PSA at this point I do not think that is warranted.  He is breast understanding regarding this discussion.  His sexual function continues to respond to Levitra.  His primary concern is his libido. His previous  testosterone was 366.  In light of his loss of libido it is reasonable to repeat his testosterone this morning to see if there is an abnormality affecting his desire.  Urinary symptoms stable flow non diagnostic PVR 0  Plan: testosterone prolactin estradiol urinalysis  fu 6 months  2.14.2024 Patient returns.  He had an episode of COVID followed by Lyme disease he was treated with antibiotics and developed candidiasis.  He complains now of recurrent penile discomfort.  On examination he has mild balanitis he.  He is uncircumcised.  His foreskin is easily retractable.  He did not treat the this area with antifungal medications.  We will start him on triamcinolone nystatin for a 1 week..  Related to his PSA.  He is status post biopsy.  He will have a repeat PSA today.  His urinary symptoms are essentially related to his caffeine intake.  He states he is "addicted to caffeine.  They are quite variable.  His flow is 11.5 cc/s with a voided volume of 95.5 cc.  His postvoid residual is 29.  Plan: 1.  PSA 2.  Urinalysis 3.  Triamcinolone nystatin twice a day for 7 days 4.  Continue Levitra as needed 5.  discussed reduction of caffeine 8 strong cups of coffee /day) 6. fu 6 months    8/13/2024  Patient returns.  He denies any significant urinary symptoms.  His postvoid residual was 34.  His flow rate was nondiagnostic.  His balanitis.  Has resolved.  His skin is normal.  He denies any current sexual dysfunction.  He states he has good erections but is rarely sexually active.  We discussed his previous PSA and  MRI. He previously had a PI-RADS 4 lesion.  The biopsy was normal.  However in light of the PI-RADS lesion we discussed proceeding with a repeat MRI at this point to be certain that there is no significant abnormality although his PSA has been stable.  Plan: 1.  MRI and 2.  PSA 3.  Follow-up in 6 months or as needed

## 2024-08-13 NOTE — HISTORY OF PRESENT ILLNESS
[Currently Experiencing ___] :  [unfilled] [Urinary Frequency] : urinary frequency [Nocturia] : nocturia [FreeTextEntry1] : 56 year old , originally from [facility] presents for a urological evaluation (libido/erectile issue, LUTS, elevated PSA) , daughter (32 years old)  Decreased libido x 3-4 years. GA 20.  responds to PD5-I; but not interested in sexual intercourse  Moderate chronic lower urinary tract symptoms, with nocturia x 2.  Flow: Peak: 19.6ml/sec, Volume: 221.8cc (bell pattern)  Elevated PSA (see full PSA history below). No family history of prostate cancer.   PSA History 7/7/22 4.7 3/10/21 4.22 12/19/19 2.66 10/30/19 5.23 3/7/17 1.48  9.29.2022 fu after perineal biopsy  4.6.2023 IPSS 6 GA 18 continues to have good erections but not interested taking levitra  2.14.2024 s/p covid s/p lyme treated with doxyclyine and developed candida and balantits now recurrent pain  not interested in sexual activity  secondary to penile infections  8/13//24 Patient comes in today for a urologic follow-up for Urinary function and erectile function Patient is able to urinate often with strong stream Patient has been having good erection without medication Discussed abnormality within the MRI No family history of prostate cancer  Symptom score IPSS:10 GA:19 Recent psa:3.69 PVR:34  PLAN Repeat MRI of the prostate PSA

## 2024-08-13 NOTE — PHYSICAL EXAM
[Abdomen Tenderness] : non-tender [Bowel Sounds] : normal bowel sounds [] : no hepato-splenomegaly [Abdomen Mass (___ Cm)] : no abdominal mass palpated [Abdomen Hernia] : no hernia was discovered [Costovertebral Angle Tenderness] : no ~M costovertebral angle tenderness [Penis Abnormality] : normal uncircumcised penis [Epididymis] : the epididymides were normal [Testes Tenderness] : no tenderness of the testes [Prostate Enlargement] : the prostate was not enlarged [Prostate Tenderness] : the prostate was not tender [de-identified] : PVR 34 [de-identified] : [employer] was offered to have a chaperone present  and declined.

## 2024-08-14 ENCOUNTER — TRANSCRIPTION ENCOUNTER (OUTPATIENT)
Age: 59
End: 2024-08-14

## 2024-08-14 LAB — PSA SERPL-MCNC: 3.63 NG/ML

## 2024-08-22 ENCOUNTER — OUTPATIENT (OUTPATIENT)
Dept: OUTPATIENT SERVICES | Facility: HOSPITAL | Age: 59
LOS: 1 days | End: 2024-08-22

## 2024-08-22 ENCOUNTER — APPOINTMENT (OUTPATIENT)
Dept: MRI IMAGING | Facility: CLINIC | Age: 59
End: 2024-08-22

## 2024-08-22 DIAGNOSIS — Z90.89 ACQUIRED ABSENCE OF OTHER ORGANS: Chronic | ICD-10-CM

## 2024-08-22 PROCEDURE — 72197 MRI PELVIS W/O & W/DYE: CPT | Mod: 26

## 2024-08-22 PROCEDURE — 76498P: CUSTOM | Mod: 26

## 2024-08-27 ENCOUNTER — APPOINTMENT (OUTPATIENT)
Dept: PEDIATRIC ORTHOPEDIC SURGERY | Facility: CLINIC | Age: 59
End: 2024-08-27
Payer: COMMERCIAL

## 2024-08-27 VITALS — DIASTOLIC BLOOD PRESSURE: 89 MMHG | TEMPERATURE: 96.4 F | SYSTOLIC BLOOD PRESSURE: 134 MMHG

## 2024-08-27 VITALS — WEIGHT: 185 LBS | HEIGHT: 67 IN | BODY MASS INDEX: 29.03 KG/M2

## 2024-08-27 DIAGNOSIS — Z78.9 OTHER SPECIFIED HEALTH STATUS: ICD-10-CM

## 2024-08-27 DIAGNOSIS — Z86.79 PERSONAL HISTORY OF OTHER DISEASES OF THE CIRCULATORY SYSTEM: ICD-10-CM

## 2024-08-27 DIAGNOSIS — M23.8X1 OTHER INTERNAL DERANGEMENTS OF RIGHT KNEE: ICD-10-CM

## 2024-08-27 PROCEDURE — 99202 OFFICE O/P NEW SF 15 MIN: CPT

## 2024-08-27 PROCEDURE — 73562 X-RAY EXAM OF KNEE 3: CPT | Mod: RT

## 2024-08-27 RX ORDER — DICLOFENAC SODIUM 75 MG/1
75 TABLET, DELAYED RELEASE ORAL TWICE DAILY
Qty: 60 | Refills: 1 | Status: ACTIVE | COMMUNITY
Start: 2024-08-27 | End: 1900-01-01

## 2024-08-27 RX ORDER — LOSARTAN POTASSIUM 25 MG/1
25 TABLET, FILM COATED ORAL
Refills: 0 | Status: ACTIVE | COMMUNITY

## 2024-08-27 NOTE — HISTORY OF PRESENT ILLNESS
[de-identified] : This 58-year-old was seen today for evaluation of his right knee.  He over the past 5 days has had swelling stiffness with transient limp.  Prior to this he did have discomfort on/off.  He has been playing pickle ball though does not recall an obvious injury.  No locking buckling or sensation of instability.  Past medical history significant for hypertension for which he is on losartan.

## 2024-08-27 NOTE — ASSESSMENT
[FreeTextEntry1] : Impression: Internal derangement right knee rule out degenerative tear medial meniscus.  This patient will be treated with diclofenac with GI precautions.  If he is not improving he will return the potential for steroid injection and formal physical therapy has been discussed

## 2024-08-27 NOTE — PHYSICAL EXAM
[de-identified] : Examination today reveals normal stance with symmetric leg lengths.  His gait minimal antalgic component on the right side.  He has good motion to the right hip the right knee slight restriction of full flexion secondary to a moderate effusion present.  There is no obvious instability on stress of the cruciate/collateral ligaments no pain on patellofemoral grind he does have discomfort in the medial compartment mainly over the posterior medial joint line with a minimally positive Steinmann/Arielle.  Popliteal fossa calf and neurovascular exam are negative.  Standing x-rays ordered and taken today 3 views of the right knee to include a Altamirano view reveal minimal degenerative change

## 2024-08-27 NOTE — CONSULT LETTER
[Dear  ___] : Dear  [unfilled], [Consult Letter:] : I had the pleasure of evaluating your patient, [unfilled]. [Please see my note below.] : Please see my note below. [Consult Closing:] : Thank you very much for allowing me to participate in the care of this patient.  If you have any questions, please do not hesitate to contact me. [Sincerely,] : Sincerely, [FreeTextEntry3] : Dr Jevon Babb JR.

## 2024-09-03 ENCOUNTER — NON-APPOINTMENT (OUTPATIENT)
Age: 59
End: 2024-09-03

## 2024-09-04 ENCOUNTER — TRANSCRIPTION ENCOUNTER (OUTPATIENT)
Age: 59
End: 2024-09-04

## 2024-09-12 ENCOUNTER — APPOINTMENT (OUTPATIENT)
Dept: UROLOGY | Facility: CLINIC | Age: 59
End: 2024-09-12
Payer: COMMERCIAL

## 2024-09-12 DIAGNOSIS — Z87.898 PERSONAL HISTORY OF OTHER SPECIFIED CONDITIONS: ICD-10-CM

## 2024-09-12 DIAGNOSIS — R93.89 ABNORMAL FINDINGS ON DIAGNOSTIC IMAGING OF OTHER SPECIFIED BODY STRUCTURES: ICD-10-CM

## 2024-09-12 PROCEDURE — G2211 COMPLEX E/M VISIT ADD ON: CPT | Mod: NC

## 2024-09-12 PROCEDURE — 99214 OFFICE O/P EST MOD 30 MIN: CPT

## 2024-09-12 NOTE — ASSESSMENT
[FreeTextEntry1] : 08.17.2022  56 year old   originally from [facility] presents for a urological evaluation , daughter (32 years old)  Decreased libido x 3-4 years. GA 20.   Longstanding moderate chronic lower urinary tract symptoms, with nocturia x 2. Had an evaluation for sleep apnea. NO VITALIY Flow: Peak: 19.6ml/sec, Volume: 221.8cc (bell pattern) Sono: 32cc PVR (done to rule out incomplete bladder emptying) Regarding his lower urinary tract symptoms, he discussed behavioral modification (decreasing afternoon fluid intake, decreased caffeine intake) in an effort to decrease the nocturia. His PVR and Flow are both satisfactory and do not suggest bladder outlet obstruction. He currently drinks 2 pots of coffee daily and two bottles of Ana Cristina. We agreed that he can decrease this to 1 pot or change to decaf.    Elevated PSA (see full PSA history below). No family history of prostate cancer.   PSA History 7/7/22 4.7 3/10/21 4.22 12/19/19 2.66 10/30/19 5.23 3/7/17 1.48  I discussed possible causes of of an elevated PSA with Mr. Holland.  We discussed need for confirmatory PSA. We discussed the risks and complications of prostate biopsy and discussed the procedure. We discussed that procedure is performed by placing a rectal probe and administering anesthesia locally. Biopsies are then taken with a needle. Multiple biopsies are taken,  Risks of the procedure include hematuria, hematospermia, blood per rectum. Risks include infection, sepsis and even death. We discussed perineal vs transrectal biopsy We discussed decreased risk of perineal biopsy vs transrectal but associated greater discomfort  We discussed options of: 1. local, 2. IV sedation in Operating room 3. po Valium Fleets enema should be taken. ASA and NSAID must be stopped 1 week prior to procedure.  Regarding the decreased libido, we will proceed with a hormonal evaluation. He denies any martial discord relating to this issue, and seems uninterested in pursuing.    Mr. Holland indicated his understanding of the plan and all questions were answered.   Plan: 1. PSA, Testosterone 2. If PSA elevated, MRI Prostate (+/-) 3. Behavioral modification for LUTS 4. Follow up in three months (if PSA is normalized)   9.29.2022 telemedicine visit with patient and wife We reviewed:  MRI  Size: 5.2 x 3.3 x 3.4 [transverse x AP x CC] cm. Volume: 30.5 mL . PSA density: 0.8 ng/mL/mL PSA density >0.15 ng/mL/mL: No Hemorrhage: None. Central gland: Moderate BPH. No suspicious nodule seen on T2-weighted imaging or on diffusion-weighted imaging. Peripheral zone: Mild bilateral patchy T2 hypointensities without restricted diffusion but with enhancement could represent prostatitis.. LESION: #1 Location: Left posterior lateral peripheral zone at apex of gland.. Slice#: Series 5 Image 21. Size (transverse, AP, CC): 5.3 x 5.6 x 5.2 mm. T2-WI: Moderately hypointense DWI: Moderately to markedly hypointense on ADC map and markedly hyperintense on DWI. DCE: Early focal enhancement Extra-prostatic extension: None PI-RADS Assessment Category: 4  We discussed and  recalculated  PSAD 0.15   We reviewed Biopsy Final Diagnosis 1. Prostate, left anterior apex, biopsy -Benign prostate tissue. 2. Prostate, left anterior base, biopsy -Benign prostate tissue. 3. Prostate, right anterior apex, biopsy -Benign prostate tissue. 4. Prostate, right anterior base, biopsy -Benign prostate tissue. 5. Prostate, midline apex, biopsy -Benign prostate tissue. 6. Prostate, midline base, biopsy -Benign prostate tissue. 7. Prostate, left posterior apex, biopsy -Benign prostate tissue. 8. Prostate, left posterior base, biopsy -Benign prostate tissue. 9. Prostate, right posterior apex, biopsy -Benign prostate tissue. 10. Prostate, right posterior base, biopsy -Benign prostate tissue. 11. Prostate, left lateral, biopsy -Benign prostate tissue. 12. Prostate, right lateral, biopsy -Benign prostate tissue. 13. Prostate, left PZPL MR target, biopsy -Benign prostate tissue.  We discussed : Age-specific reference ranges - In men without prostate cancer, serum PSA reflects the amount of glandular epithelium, which in turn reflects prostate size. Thus, as prostate size increases with increasing age, the PSA concentration also rises; it increases at a faster rate in older adult men [</contents/measurement-of-prostate-specific-antigen/abstract/9>]. In one study of 471 men, the serum PSA concentration increased by approximately 3.2 percent (0.04 ng/mL) per year for a healthy 60-year-old [10 </contents/measurement-of-prostate-specific-antigen/abstract/10>]. As a result, different normal reference ranges may be appropriate based upon a man's age [11,12 </contents/measurement-of-prostate-specific-antigen/abstract/11,12>]: ?40 to 49 years - 0 to 2.5 ng/mL ?50 to 59 years - 0 to 3.5 ng/mL ?60 to 69 years - 0 to 4.5 ng/mL ?70 to 79 years - 0 to 6.5 ng/mL   We discussed concept of PSAD as opposed to PSA age We again reviewed technique of FUSION BIOPYS We reviewed possibility of misseed significant lesion and delay in diagnosis We discussed need to carefully monitor PSA and possible repeat MRI or biopsy based upon of PSA dynamics [facility]  expressed fully understanding of the information provided, the consequences and the management.  Plan: 1. PSA in 2.2023 2. appt made   4.6.2023 Patient returns after previous perineal prostate biopsy for elevated PSA.  His recent PSA measures 3.34 his PSA has varied from as high as 5.23 in October 2019.  In August 2022 it was 2.72.  He previously underwent a perineal biopsy which did not reveal any prostate cancer or areas of concern.  We discussed continuing to monitor his PSA again in 6 months.  We also discussed MDX.  In light of the normal PSA at this point I do not think that is warranted.  He is breast understanding regarding this discussion.  His sexual function continues to respond to Levitra.  His primary concern is his libido. His previous  testosterone was 366.  In light of his loss of libido it is reasonable to repeat his testosterone this morning to see if there is an abnormality affecting his desire.  Urinary symptoms stable flow non diagnostic PVR 0  Plan: testosterone prolactin estradiol urinalysis  fu 6 months  2.14.2024 Patient returns.  He had an episode of COVID followed by Lyme disease he was treated with antibiotics and developed candidiasis.  He complains now of recurrent penile discomfort.  On examination he has mild balanitis he.  He is uncircumcised.  His foreskin is easily retractable.  He did not treat the this area with antifungal medications.  We will start him on triamcinolone nystatin for a 1 week..  Related to his PSA.  He is status post biopsy.  He will have a repeat PSA today.  His urinary symptoms are essentially related to his caffeine intake.  He states he is "addicted to caffeine.  They are quite variable.  His flow is 11.5 cc/s with a voided volume of 95.5 cc.  His postvoid residual is 29.  Plan: 1.  PSA 2.  Urinalysis 3.  Triamcinolone nystatin twice a day for 7 days 4.  Continue Levitra as needed 5.  discussed reduction of caffeine 8 strong cups of coffee /day) 6. fu 6 months    8/13/2024  Patient returns.  He denies any significant urinary symptoms.  His postvoid residual was 34.  His flow rate was nondiagnostic.  His balanitis.  Has resolved.  His skin is normal.  He denies any current sexual dysfunction.  He states he has good erections but is rarely sexually active.  We discussed his previous PSA and  MRI. He previously had a PI-RADS 4 lesion.  The biopsy was normal.  However in light of the PI-RADS lesion we discussed proceeding with a repeat MRI at this point to be certain that there is no significant abnormality although his PSA has been stable.  Plan: 1.  MRI and 2.  PSA 3.  Follow-up in 6 months or as needed  9.12.2024 Patient returns after MRI.  His MRI was reviewed.  The previously noted lesion which had been read as a PI-RADS 4 is now read as a PI-RADS 3 and is essentially unchanged in terms of size.  His previous biopsy (target) did not reveal any carcinoma.  His PSA on August 13 is normal at 3.63.  This is consistent with his previous PSA in February which was 3.69.  We discussed the options which are: 1.  Continue observation with serial PSA 2.  Repeat perineal fusion biopsy 3.  Proceed with MDX.  If the MDX demonstrates high risk of prostate cancer then he certainly should undergo a repeat biopsy.  If this does not demonstrate high risk he will continue to be observed.  We discussed the MDX test. We discussed lack of change in size and the stable PSA Change in either is a trigger to repeat biopsy  Even if there were diagnosed cancer which was being surveilled PSA and MRI changes are triggers We discussed potential repeat MRI fusion biopsy  Plan MdX today fu in 3 months unless MDX is of concern [facility]  expressed fully understanding of the information provided, the consequences and the management.

## 2024-09-12 NOTE — PHYSICAL EXAM
[Bowel Sounds] : normal bowel sounds [Abdomen Tenderness] : non-tender [] : no hepato-splenomegaly [Abdomen Mass (___ Cm)] : no abdominal mass palpated [Abdomen Hernia] : no hernia was discovered [Costovertebral Angle Tenderness] : no ~M costovertebral angle tenderness [Penis Abnormality] : normal uncircumcised penis [Epididymis] : the epididymides were normal [Testes Tenderness] : no tenderness of the testes [Prostate Enlargement] : the prostate was not enlarged [Prostate Tenderness] : the prostate was not tender [de-identified] : PVR 34 [de-identified] : [employer] [facility] was offered to have a chaperone present  and declined. [Chaperone Declined] : Patient declined chaperone

## 2024-09-12 NOTE — HISTORY OF PRESENT ILLNESS
[Currently Experiencing ___] :  [unfilled] [Urinary Frequency] : urinary frequency [Nocturia] : nocturia [FreeTextEntry1] : 56 year old , originally from [facility] presents for a urological evaluation (libido/erectile issue, LUTS, elevated PSA) , daughter (32 years old)  Decreased libido x 3-4 years. GA 20.  responds to PD5-I; but not interested in sexual intercourse  Moderate chronic lower urinary tract symptoms, with nocturia x 2.  Flow: Peak: 19.6ml/sec, Volume: 221.8cc (bell pattern)  Elevated PSA (see full PSA history below). No family history of prostate cancer.   PSA History 7/7/22 4.7 3/10/21 4.22 12/19/19 2.66 10/30/19 5.23 3/7/17 1.48  9.29.2022 fu after perineal biopsy  4.6.2023 IPSS 6 GA 16 continues to have good erections but not interested taking levitra  2.14.2024 s/p covid s/p lyme treated with doxyclyine and developed candida and balantits now recurrent pain  not interested in sexual activity  secondary to penile infections  8/13//24 Patient comes in today for a urologic follow-up for Urinary function and erectile function Patient is able to urinate often with strong stream Patient has been having good erection without medication Discussed abnormality within the MRI No family history of prostate cancer  Symptom score IPSS:10 GA:19 Recent psa:3.69 PVR:34  PLAN Repeat MRI of the prostate PSA   9.12.2024

## 2024-09-12 NOTE — ASSESSMENT
[FreeTextEntry1] : 08.17.2022  56 year old   originally from [facility] presents for a urological evaluation , daughter (32 years old)  Decreased libido x 3-4 years. GA 20.   Longstanding moderate chronic lower urinary tract symptoms, with nocturia x 2. Had an evaluation for sleep apnea. NO VITALIY Flow: Peak: 19.6ml/sec, Volume: 221.8cc (bell pattern) Sono: 32cc PVR (done to rule out incomplete bladder emptying) Regarding his lower urinary tract symptoms, he discussed behavioral modification (decreasing afternoon fluid intake, decreased caffeine intake) in an effort to decrease the nocturia. His PVR and Flow are both satisfactory and do not suggest bladder outlet obstruction. He currently drinks 2 pots of coffee daily and two bottles of Ana Cristina. We agreed that he can decrease this to 1 pot or change to decaf.    Elevated PSA (see full PSA history below). No family history of prostate cancer.   PSA History 7/7/22 4.7 3/10/21 4.22 12/19/19 2.66 10/30/19 5.23 3/7/17 1.48  I discussed possible causes of of an elevated PSA with Mr. Holland.  We discussed need for confirmatory PSA. We discussed the risks and complications of prostate biopsy and discussed the procedure. We discussed that procedure is performed by placing a rectal probe and administering anesthesia locally. Biopsies are then taken with a needle. Multiple biopsies are taken,  Risks of the procedure include hematuria, hematospermia, blood per rectum. Risks include infection, sepsis and even death. We discussed perineal vs transrectal biopsy We discussed decreased risk of perineal biopsy vs transrectal but associated greater discomfort  We discussed options of: 1. local, 2. IV sedation in Operating room 3. po Valium Fleets enema should be taken. ASA and NSAID must be stopped 1 week prior to procedure.  Regarding the decreased libido, we will proceed with a hormonal evaluation. He denies any martial discord relating to this issue, and seems uninterested in pursuing.    Mr. Holland indicated his understanding of the plan and all questions were answered.   Plan: 1. PSA, Testosterone 2. If PSA elevated, MRI Prostate (+/-) 3. Behavioral modification for LUTS 4. Follow up in three months (if PSA is normalized)   9.29.2022 telemedicine visit with patient and wife We reviewed:  MRI  Size: 5.2 x 3.3 x 3.4 [transverse x AP x CC] cm. Volume: 30.5 mL . PSA density: 0.8 ng/mL/mL PSA density >0.15 ng/mL/mL: No Hemorrhage: None. Central gland: Moderate BPH. No suspicious nodule seen on T2-weighted imaging or on diffusion-weighted imaging. Peripheral zone: Mild bilateral patchy T2 hypointensities without restricted diffusion but with enhancement could represent prostatitis.. LESION: #1 Location: Left posterior lateral peripheral zone at apex of gland.. Slice#: Series 5 Image 21. Size (transverse, AP, CC): 5.3 x 5.6 x 5.2 mm. T2-WI: Moderately hypointense DWI: Moderately to markedly hypointense on ADC map and markedly hyperintense on DWI. DCE: Early focal enhancement Extra-prostatic extension: None PI-RADS Assessment Category: 4  We discussed and  recalculated  PSAD 0.15   We reviewed Biopsy Final Diagnosis 1. Prostate, left anterior apex, biopsy -Benign prostate tissue. 2. Prostate, left anterior base, biopsy -Benign prostate tissue. 3. Prostate, right anterior apex, biopsy -Benign prostate tissue. 4. Prostate, right anterior base, biopsy -Benign prostate tissue. 5. Prostate, midline apex, biopsy -Benign prostate tissue. 6. Prostate, midline base, biopsy -Benign prostate tissue. 7. Prostate, left posterior apex, biopsy -Benign prostate tissue. 8. Prostate, left posterior base, biopsy -Benign prostate tissue. 9. Prostate, right posterior apex, biopsy -Benign prostate tissue. 10. Prostate, right posterior base, biopsy -Benign prostate tissue. 11. Prostate, left lateral, biopsy -Benign prostate tissue. 12. Prostate, right lateral, biopsy -Benign prostate tissue. 13. Prostate, left PZPL MR target, biopsy -Benign prostate tissue.  We discussed : Age-specific reference ranges - In men without prostate cancer, serum PSA reflects the amount of glandular epithelium, which in turn reflects prostate size. Thus, as prostate size increases with increasing age, the PSA concentration also rises; it increases at a faster rate in older adult men [</contents/measurement-of-prostate-specific-antigen/abstract/9>]. In one study of 471 men, the serum PSA concentration increased by approximately 3.2 percent (0.04 ng/mL) per year for a healthy 60-year-old [10 </contents/measurement-of-prostate-specific-antigen/abstract/10>]. As a result, different normal reference ranges may be appropriate based upon a man's age [11,12 </contents/measurement-of-prostate-specific-antigen/abstract/11,12>]: ?40 to 49 years - 0 to 2.5 ng/mL ?50 to 59 years - 0 to 3.5 ng/mL ?60 to 69 years - 0 to 4.5 ng/mL ?70 to 79 years - 0 to 6.5 ng/mL   We discussed concept of PSAD as opposed to PSA age We again reviewed technique of FUSION BIOPYS We reviewed possibility of misseed significant lesion and delay in diagnosis We discussed need to carefully monitor PSA and possible repeat MRI or biopsy based upon of PSA dynamics [facility]  expressed fully understanding of the information provided, the consequences and the management.  Plan: 1. PSA in 2.2023 2. appt made   4.6.2023 Patient returns after previous perineal prostate biopsy for elevated PSA.  His recent PSA measures 3.34 his PSA has varied from as high as 5.23 in October 2019.  In August 2022 it was 2.72.  He previously underwent a perineal biopsy which did not reveal any prostate cancer or areas of concern.  We discussed continuing to monitor his PSA again in 6 months.  We also discussed MDX.  In light of the normal PSA at this point I do not think that is warranted.  He is breast understanding regarding this discussion.  His sexual function continues to respond to Levitra.  His primary concern is his libido. His previous  testosterone was 366.  In light of his loss of libido it is reasonable to repeat his testosterone this morning to see if there is an abnormality affecting his desire.  Urinary symptoms stable flow non diagnostic PVR 0  Plan: testosterone prolactin estradiol urinalysis  fu 6 months  2.14.2024 Patient returns.  He had an episode of COVID followed by Lyme disease he was treated with antibiotics and developed candidiasis.  He complains now of recurrent penile discomfort.  On examination he has mild balanitis he.  He is uncircumcised.  His foreskin is easily retractable.  He did not treat the this area with antifungal medications.  We will start him on triamcinolone nystatin for a 1 week..  Related to his PSA.  He is status post biopsy.  He will have a repeat PSA today.  His urinary symptoms are essentially related to his caffeine intake.  He states he is "addicted to caffeine.  They are quite variable.  His flow is 11.5 cc/s with a voided volume of 95.5 cc.  His postvoid residual is 29.  Plan: 1.  PSA 2.  Urinalysis 3.  Triamcinolone nystatin twice a day for 7 days 4.  Continue Levitra as needed 5.  discussed reduction of caffeine 8 strong cups of coffee /day) 6. fu 6 months    8/13/2024  Patient returns.  He denies any significant urinary symptoms.  His postvoid residual was 34.  His flow rate was nondiagnostic.  His balanitis.  Has resolved.  His skin is normal.  He denies any current sexual dysfunction.  He states he has good erections but is rarely sexually active.  We discussed his previous PSA and  MRI. He previously had a PI-RADS 4 lesion.  The biopsy was normal.  However in light of the PI-RADS lesion we discussed proceeding with a repeat MRI at this point to be certain that there is no significant abnormality although his PSA has been stable.  Plan: 1.  MRI and 2.  PSA 3.  Follow-up in 6 months or as needed  9.12.2024 Patient returns after MRI.  His MRI was reviewed.  The previously noted lesion which had been read as a PI-RADS 4 is now read as a PI-RADS 3 and is essentially unchanged in terms of size.  His previous biopsy (target) did not reveal any carcinoma.  His PSA on August 13 is normal at 3.63.  This is consistent with his previous PSA in February which was 3.69.  We discussed the options which are: 1.  Continue observation with serial PSA 2.  Repeat perineal fusion biopsy 3.  Proceed with MDX.  If the MDX demonstrates high risk of prostate cancer then he certainly should undergo a repeat biopsy.  If this does not demonstrate high risk he will continue to be observed.  We discussed the MDX test. We discussed lack of change in size and the stable PSA Change in either is a trigger to repeat biopsy  Even if there were diagnosed cancer which was being surveilled PSA and MRI changes are triggers We discussed potential repeat MRI fusion biopsy  Plan MdX today fu in 3 months unless MDX is of concern [facility]  expressed fully understanding of the information provided, the consequences and the management.

## 2024-09-12 NOTE — PHYSICAL EXAM
[Bowel Sounds] : normal bowel sounds [Abdomen Tenderness] : non-tender [] : no hepato-splenomegaly [Abdomen Mass (___ Cm)] : no abdominal mass palpated [Abdomen Hernia] : no hernia was discovered [Costovertebral Angle Tenderness] : no ~M costovertebral angle tenderness [Penis Abnormality] : normal uncircumcised penis [Epididymis] : the epididymides were normal [Testes Tenderness] : no tenderness of the testes [Prostate Enlargement] : the prostate was not enlarged [Prostate Tenderness] : the prostate was not tender [de-identified] : PVR 34 [de-identified] : [employer] [facility] was offered to have a chaperone present  and declined. [Chaperone Declined] : Patient declined chaperone

## 2024-09-12 NOTE — HISTORY OF PRESENT ILLNESS
[Currently Experiencing ___] :  [unfilled] [Urinary Frequency] : urinary frequency [Nocturia] : nocturia [FreeTextEntry1] : 56 year old , originally from [facility] presents for a urological evaluation (libido/erectile issue, LUTS, elevated PSA) , daughter (32 years old)  Decreased libido x 3-4 years. GA 20.  responds to PD5-I; but not interested in sexual intercourse  Moderate chronic lower urinary tract symptoms, with nocturia x 2.  Flow: Peak: 19.6ml/sec, Volume: 221.8cc (bell pattern)  Elevated PSA (see full PSA history below). No family history of prostate cancer.   PSA History 7/7/22 4.7 3/10/21 4.22 12/19/19 2.66 10/30/19 5.23 3/7/17 1.48  9.29.2022 fu after perineal biopsy  4.6.2023 IPSS 6 GA 16 continues to have good erections but not interested taking levitra  2.14.2024 s/p covid s/p lyme treated with doxyclyine and developed candida and balantits now recurrent pain  not interested in sexual activity  secondary to penile infections  8/13//24 Patient comes in today for a urologic follow-up for Urinary function and erectile function Patient is able to urinate often with strong stream Patient has been having good erection without medication Discussed abnormality within the MRI No family history of prostate cancer  Symptom score IPSS:10 GA:19 Recent psa:3.69 PVR:34  PLAN Repeat MRI of the prostate PSA   9.12.2024

## 2024-09-18 ENCOUNTER — TRANSCRIPTION ENCOUNTER (OUTPATIENT)
Age: 59
End: 2024-09-18

## 2024-09-19 ENCOUNTER — TRANSCRIPTION ENCOUNTER (OUTPATIENT)
Age: 59
End: 2024-09-19

## 2025-04-01 ENCOUNTER — APPOINTMENT (OUTPATIENT)
Dept: INTERNAL MEDICINE | Facility: CLINIC | Age: 60
End: 2025-04-01
Payer: COMMERCIAL

## 2025-04-01 VITALS
OXYGEN SATURATION: 98 % | BODY MASS INDEX: 29.88 KG/M2 | HEART RATE: 95 BPM | TEMPERATURE: 97.7 F | WEIGHT: 190.4 LBS | HEIGHT: 67 IN | DIASTOLIC BLOOD PRESSURE: 95 MMHG | SYSTOLIC BLOOD PRESSURE: 143 MMHG

## 2025-04-01 DIAGNOSIS — Z11.59 ENCOUNTER FOR SCREENING FOR OTHER VIRAL DISEASES: ICD-10-CM

## 2025-04-01 DIAGNOSIS — R10.11 RIGHT UPPER QUADRANT PAIN: ICD-10-CM

## 2025-04-01 PROCEDURE — 99214 OFFICE O/P EST MOD 30 MIN: CPT | Mod: 25

## 2025-04-02 PROBLEM — Z11.59 SCREENING FOR VIRAL DISEASE: Status: RESOLVED | Noted: 2022-08-30 | Resolved: 2025-04-02

## 2025-04-02 PROBLEM — R10.11 RUQ DISCOMFORT: Status: ACTIVE | Noted: 2025-04-01

## 2025-04-03 ENCOUNTER — TRANSCRIPTION ENCOUNTER (OUTPATIENT)
Age: 60
End: 2025-04-03

## 2025-04-03 LAB
ALBUMIN SERPL ELPH-MCNC: 5 G/DL
ALP BLD-CCNC: 114 U/L
ALT SERPL-CCNC: 37 U/L
AMYLASE/CREAT SERPL: 56 U/L
ANION GAP SERPL CALC-SCNC: 14 MMOL/L
APPEARANCE: CLEAR
AST SERPL-CCNC: 40 U/L
BASOPHILS # BLD AUTO: 0.03 K/UL
BASOPHILS NFR BLD AUTO: 0.4 %
BILIRUB SERPL-MCNC: 0.3 MG/DL
BILIRUBIN URINE: NEGATIVE
BLOOD URINE: NEGATIVE
BUN SERPL-MCNC: 14 MG/DL
CALCIUM SERPL-MCNC: 10.2 MG/DL
CHLORIDE SERPL-SCNC: 103 MMOL/L
CO2 SERPL-SCNC: 25 MMOL/L
COLOR: YELLOW
CREAT SERPL-MCNC: 0.82 MG/DL
EGFRCR SERPLBLD CKD-EPI 2021: 100 ML/MIN/1.73M2
EOSINOPHIL # BLD AUTO: 0.32 K/UL
EOSINOPHIL NFR BLD AUTO: 3.9 %
GLUCOSE QUALITATIVE U: NEGATIVE MG/DL
GLUCOSE SERPL-MCNC: 92 MG/DL
HAV IGM SER QL: NONREACTIVE
HBV CORE IGM SER QL: NONREACTIVE
HBV SURFACE AG SER QL: NONREACTIVE
HCT VFR BLD CALC: 47 %
HCV AB SER QL: NONREACTIVE
HCV S/CO RATIO: 0.24 S/CO
HGB BLD-MCNC: 16.1 G/DL
IMM GRANULOCYTES NFR BLD AUTO: 0.1 %
KETONES URINE: NEGATIVE MG/DL
LEUKOCYTE ESTERASE URINE: NEGATIVE
LPL SERPL-CCNC: 22 U/L
LYMPHOCYTES # BLD AUTO: 1.57 K/UL
LYMPHOCYTES NFR BLD AUTO: 19 %
MAN DIFF?: NORMAL
MCHC RBC-ENTMCNC: 31.6 PG
MCHC RBC-ENTMCNC: 34.3 G/DL
MCV RBC AUTO: 92.2 FL
MONOCYTES # BLD AUTO: 0.72 K/UL
MONOCYTES NFR BLD AUTO: 8.7 %
NEUTROPHILS # BLD AUTO: 5.62 K/UL
NEUTROPHILS NFR BLD AUTO: 67.9 %
NITRITE URINE: NEGATIVE
PH URINE: 5.5
PLATELET # BLD AUTO: 312 K/UL
POTASSIUM SERPL-SCNC: 5.3 MMOL/L
PROT SERPL-MCNC: 7.8 G/DL
PROTEIN URINE: NEGATIVE MG/DL
RBC # BLD: 5.1 M/UL
RBC # FLD: 12.9 %
SODIUM SERPL-SCNC: 142 MMOL/L
SPECIFIC GRAVITY URINE: 1.02
UREA BREATH TEST QL: NEGATIVE
UROBILINOGEN URINE: 0.2 MG/DL
WBC # FLD AUTO: 8.27 K/UL

## 2025-04-10 ENCOUNTER — TRANSCRIPTION ENCOUNTER (OUTPATIENT)
Age: 60
End: 2025-04-10

## 2025-04-10 NOTE — REVIEW OF SYSTEMS
Patient is informed and agreeable to plan     Please review and sign order   [Fatigue] : fatigue [Muscle Pain] : muscle pain [Negative] : Heme/Lymph [Fever] : no fever [Night Sweats] : no night sweats [Muscle Weakness] : no muscle weakness

## 2025-04-11 ENCOUNTER — APPOINTMENT (OUTPATIENT)
Dept: ULTRASOUND IMAGING | Facility: CLINIC | Age: 60
End: 2025-04-11
Payer: COMMERCIAL

## 2025-04-11 ENCOUNTER — OUTPATIENT (OUTPATIENT)
Dept: OUTPATIENT SERVICES | Facility: HOSPITAL | Age: 60
LOS: 1 days | End: 2025-04-11

## 2025-04-11 DIAGNOSIS — Z90.89 ACQUIRED ABSENCE OF OTHER ORGANS: Chronic | ICD-10-CM

## 2025-04-11 PROCEDURE — 76700 US EXAM ABDOM COMPLETE: CPT | Mod: 26

## 2025-04-15 ENCOUNTER — APPOINTMENT (OUTPATIENT)
Dept: CT IMAGING | Facility: CLINIC | Age: 60
End: 2025-04-15

## 2025-04-16 ENCOUNTER — APPOINTMENT (OUTPATIENT)
Dept: INTERNAL MEDICINE | Facility: CLINIC | Age: 60
End: 2025-04-16

## 2025-04-17 ENCOUNTER — NON-APPOINTMENT (OUTPATIENT)
Age: 60
End: 2025-04-17

## 2025-04-17 DIAGNOSIS — K76.0 FATTY (CHANGE OF) LIVER, NOT ELSEWHERE CLASSIFIED: ICD-10-CM

## 2025-05-13 ENCOUNTER — APPOINTMENT (OUTPATIENT)
Dept: HEPATOLOGY | Facility: CLINIC | Age: 60
End: 2025-05-13

## 2025-08-21 ENCOUNTER — APPOINTMENT (OUTPATIENT)
Dept: HEPATOLOGY | Facility: CLINIC | Age: 60
End: 2025-08-21

## 2025-09-04 ENCOUNTER — APPOINTMENT (OUTPATIENT)
Dept: GASTROENTEROLOGY | Facility: CLINIC | Age: 60
End: 2025-09-04

## (undated) DEVICE — PREP BETADINE SPONGE STICKS

## (undated) DEVICE — SYR LUER LOK 50CC

## (undated) DEVICE — SYR LUER LOK 10CC

## (undated) DEVICE — DRAPE MEDIUM SHEET 44" X 70"

## (undated) DEVICE — NDL BIOPSY CHIBA 22G X 20CM

## (undated) DEVICE — VENODYNE/SCD SLEEVE CALF MEDIUM

## (undated) DEVICE — PLASTIC SOLUTION BOWL 160Z

## (undated) DEVICE — WARMING BLANKET UPPER ADULT

## (undated) DEVICE — GRID BRACHYTHERAPY EZ 18G

## (undated) DEVICE — BALLOON ENDOCAVITY 2X14CM

## (undated) DEVICE — DRAPE TOWEL BLUE 17" X 24"

## (undated) DEVICE — GLV 7.5 PROTEXIS (WHITE)

## (undated) DEVICE — SYR CATH TIP 2 OZ

## (undated) DEVICE — NDL HYPO REGULAR BEVEL 25G X 1.5" (BLUE)

## (undated) DEVICE — DRSG TELFA 3 X 8

## (undated) DEVICE — NDL MAX CORE 18G X 25CM